# Patient Record
Sex: FEMALE | Race: AMERICAN INDIAN OR ALASKA NATIVE
[De-identification: names, ages, dates, MRNs, and addresses within clinical notes are randomized per-mention and may not be internally consistent; named-entity substitution may affect disease eponyms.]

---

## 2017-04-01 ENCOUNTER — HOSPITAL ENCOUNTER (EMERGENCY)
Dept: HOSPITAL 31 - C.ER | Age: 72
Discharge: HOME | End: 2017-04-01
Payer: COMMERCIAL

## 2017-04-01 VITALS — TEMPERATURE: 97.8 F | SYSTOLIC BLOOD PRESSURE: 175 MMHG | OXYGEN SATURATION: 98 % | DIASTOLIC BLOOD PRESSURE: 90 MMHG

## 2017-04-01 VITALS — RESPIRATION RATE: 20 BRPM | HEART RATE: 81 BPM

## 2017-04-01 DIAGNOSIS — M25.561: Primary | ICD-10-CM

## 2017-04-01 PROCEDURE — 99283 EMERGENCY DEPT VISIT LOW MDM: CPT

## 2017-04-01 PROCEDURE — 96372 THER/PROPH/DIAG INJ SC/IM: CPT

## 2017-04-01 NOTE — C.PDOC
History Of Present Illness


72 y/o female presents to the ED with complains of chronic right knee pain with 

exacerbation over the past week. Pt denies fall or new trauma. Pt is scheduled 

to have surgery in future for cartilage replacement. Pt denies numbness, 

weakness, fever or any other complaints.


Chief Complaint (Nursing): Back Pain


History Per: Patient


History/Exam Limitations: no limitations


Onset/Duration Of Symptoms: Days


Current Symptoms Are (Timing): Still Present


Quality Of Discomfort: "Pain"


Severity: Moderate


Previous Symptoms: Chronic Pain


Associated Symptoms: None


Recent travel outside of the [place]: No





Past Medical History


Reviewed: Historical Data, Nursing Documentation, Vital Signs


Vital Signs: 


 Last Vital Signs











Temp  97.8 F   04/01/17 10:34


 


Pulse  81   04/01/17 10:34


 


Resp  20   04/01/17 10:34


 


BP  175/90 H  04/01/17 10:34


 


Pulse Ox  98   04/01/17 10:55














- Medical History


PMH: Bronchitis, COPD, HTN





- [facility] Procedures








CYSTOCELE REPAIR (07/06/98)


CYSTOGRAM NEC (04/27/98)


CYSTOMETROGRAM (04/27/98)


CYSTOSCOPY NEC (07/06/98)


OTHER CYSTOSTOMY (07/06/98)


SUPRAPUBIC SLING OP (07/06/98)








Family History: States: Unknown Family Hx





- Social History


Hx Alcohol Use: No


Hx Substance Use: No





- Immunization History


Hx Tetanus Toxoid Vaccination: Yes


Hx Influenza Vaccination: Yes


Hx Pneumococcal Vaccination: Yes





Review Of Systems


Except As Marked, All Systems Reviewed And Found Negative.


Constitutional: Negative for: Fever


Musculoskeletal: Positive for: Other (right knee pain)


Neurological: Negative for: Weakness, Numbness





Physical Exam





- Physical Exam


Appears: Non-toxic, No Acute Distress


Skin: Warm, Dry, No Rash


Head: Atraumatic, Normacephalic


Extremity: Capillary Refill (<2 seconds), No Deformity, Other (chronic swollen 

right knee joint, creppitus noted with flexion and extension)


Pulses: Right Dorsalis Pedis: Normal


Neurological/Psych: Oriented x3, Normal Speech, Normal Motor, Normal Sensation





ED Course And Treatment


O2 Sat by Pulse Oximetry: 98 (on room air)


Pulse Ox Interpretation: Normal





Disposition





- Disposition


[facility] 


[facility] - 


Disposition: HOME/ ROUTINE


Disposition Time: 09:30


Condition: GOOD


Additional Instructions: 





Thank you for letting us take care of you today. Your provider was Dr. Schmidt. You were treated for chronic knee pain. The emergency medical care 

you received today was directed at your acute symptoms. If you were prescribed 

any medication, please fill it and take as directed. It may take several days 

for your symptoms to resolve. Return to the Emergency Department if your 

symptoms worsen, do not improve, or if you have any other problems.





Please contact your doctor or call one of the physicians/clinics you have been 

referred to that are listed on the Patient Visit Information form that is 

included in your discharge packet. Bring any paperwork you were given at 

discharge with you along with any medications you are taking to your follow up 

visit. Our treatment cannot replace ongoing medical care by a primary care 

provider (PCP) outside of the emergency department.





Thank you for allowing the [facility] team to be part of your care today.














Follow up with your doctor on Wednesday as scheduled for re-evaluation.


Prescriptions: 


Cyclobenzaprine [Cyclobenzaprine HCl] 10 mg PO Q8 PRN #20 tab


 PRN Reason: Muscle Spasm


Ibuprofen [Motrin] 600 mg PO Q6 PRN #20 tab


 PRN Reason: Pain, Moderate (4-7)


Instructions:  Knee Pain (ED)





- Clinical Impression


Clinical Impression: 


 Right knee pain





- Scribe Statement


The provider has reviewed the documentation as recorded by the Tania Patel


Provider Attestation: 





All medical record entries made by the Tania were at my direction and 

personally dictated by me. I have reviewed the chart and agree that the record 

accurately reflects my personal performance of the history, physical exam, 

medical decision making, and the department course for this patient. I have 

also personally directed, reviewed, and agree with the discharge instructions 

and disposition.

## 2018-01-31 ENCOUNTER — HOSPITAL ENCOUNTER (INPATIENT)
Dept: HOSPITAL 31 - C.9S | Age: 73
LOS: 3 days | Discharge: NURSING, REHAB OR LONG-TERM CARE | DRG: 470 | End: 2018-02-03
Attending: INTERNAL MEDICINE | Admitting: INTERNAL MEDICINE
Payer: MEDICARE

## 2018-01-31 VITALS — BODY MASS INDEX: 27.3 KG/M2

## 2018-01-31 DIAGNOSIS — I51.7: ICD-10-CM

## 2018-01-31 DIAGNOSIS — J44.9: ICD-10-CM

## 2018-01-31 DIAGNOSIS — R50.82: ICD-10-CM

## 2018-01-31 DIAGNOSIS — E78.5: ICD-10-CM

## 2018-01-31 DIAGNOSIS — M65.9: ICD-10-CM

## 2018-01-31 DIAGNOSIS — M17.11: Primary | ICD-10-CM

## 2018-01-31 DIAGNOSIS — I11.9: ICD-10-CM

## 2018-01-31 PROCEDURE — 0SRC069 REPLACEMENT OF RIGHT KNEE JOINT WITH OXIDIZED ZIRCONIUM ON POLYETHYLENE SYNTHETIC SUBSTITUTE, CEMENTED, OPEN APPROACH: ICD-10-PCS | Performed by: STUDENT IN AN ORGANIZED HEALTH CARE EDUCATION/TRAINING PROGRAM

## 2018-01-31 RX ADMIN — CEFAZOLIN SODIUM SCH MLS/HR: 2 SOLUTION INTRAVENOUS at 22:17

## 2018-01-31 NOTE — RAD
Indication: Status post right total knee arthroplasty 



Comparison: Bilateral knee radiographs performed 1/5/18. 



Two views, left knee 



Findings: 



The patient is status post left knee total arthroplasty.  Alignment 

appears satisfactory.  Soft tissue swelling, subcutaneous emphysema, 

drainage catheter, and surgical staples compatible with recent 

postoperative history 



Impression: 



Status post left knee arthroplasty as above.

## 2018-01-31 NOTE — PCM.ANESB7
Adductor Canal Block





- Adductor Canal Block


Date of Procedure: 01/31/18


Anesthiologist: linwood


Pre-Procedure Diagnosis: right knee oa`


Post-Procedure Diagnosis: same


Procedure Performed: Adductor Canal Block Right





- Procedure


Adductor Canal Block: 


The procedure was explained to the patient that it is for the post-operative 

pain management. Consent was obtained after a thorough discussion with the 

patient regarding the benefits and possible complications of local anesthetic 

adductor canal block of the femoral nerve. Standard monitors, as defined by the 

ASA, were applied to the patient. Time-out was held with the circulating nurse 

to confirm the appropriate block. After applying supplemental oxygen and 

administering IV Sedation as needed, the patient was placed in supine position 

with and the operative leg was flexed slightly at the knee and externally 

rotated as needed, and was kept anatomically stable. The mid-thigh of the  _____

___  lower extremity was exposed. The ultrasound transducer was then applied 

transversely along the medial aspect, about midway down the thigh and the 

femoral artery and vein were identified in appropriate relation with the 

sartorius muscle. At this time, the femoral nerve was visualized lateral to the 

femoral artery within the canal. After thorough identification, this area area 

was prepped with Chloroprep solution three times and 1 % Lidocaine was injected 

subcutaneously for topical anesthesia.





After negative aspiration, ___5__cc of __.25___% _______bupivicaine_____________

was injected and this was followed with ___15___ cc of ___.25____ % _______

bupvicaine_______. Under ultrasound guidance the local anesthetics were 

observed spreading around the femoral nerve. The needle was removed intact and 

sterile dressing was applied. The patient had stable vital signs, was conscious 

and in no apparent distress.





The patient tolerated the femoral nerve block well with stable vital signs and 

was prepared for subsequent surgery

## 2018-02-01 LAB
ALBUMIN SERPL-MCNC: 2.9 G/DL (ref 3.5–5)
ALBUMIN SERPL-MCNC: 3.2 G/DL (ref 3.5–5)
ALBUMIN/GLOB SERPL: 1 {RATIO} (ref 1–2.1)
ALBUMIN/GLOB SERPL: 1.1 {RATIO} (ref 1–2.1)
ALT SERPL-CCNC: 25 U/L (ref 9–52)
ALT SERPL-CCNC: 30 U/L (ref 9–52)
AST SERPL-CCNC: 22 U/L (ref 14–36)
AST SERPL-CCNC: 22 U/L (ref 14–36)
BASOPHILS # BLD AUTO: 0 K/UL (ref 0–0.2)
BASOPHILS NFR BLD: 0.5 % (ref 0–2)
BUN SERPL-MCNC: 19 MG/DL (ref 7–17)
BUN SERPL-MCNC: 20 MG/DL (ref 7–17)
CALCIUM SERPL-MCNC: 8.2 MG/DL (ref 8.6–10.4)
CALCIUM SERPL-MCNC: 8.3 MG/DL (ref 8.6–10.4)
EOSINOPHIL # BLD AUTO: 0.1 K/UL (ref 0–0.7)
EOSINOPHIL NFR BLD: 1.6 % (ref 0–4)
ERYTHROCYTE [DISTWIDTH] IN BLOOD BY AUTOMATED COUNT: 15 % (ref 11.5–14.5)
ERYTHROCYTE [DISTWIDTH] IN BLOOD BY AUTOMATED COUNT: 15.2 % (ref 11.5–14.5)
GFR NON-AFRICAN AMERICAN: 55
GFR NON-AFRICAN AMERICAN: 55
HGB BLD-MCNC: 10.5 G/DL (ref 11–16)
HGB BLD-MCNC: 10.8 G/DL (ref 11–16)
INR PPP: 1.1
LYMPHOCYTES # BLD AUTO: 2.2 K/UL (ref 1–4.3)
LYMPHOCYTES NFR BLD AUTO: 28.9 % (ref 20–40)
MCH RBC QN AUTO: 28.3 PG (ref 27–31)
MCH RBC QN AUTO: 29 PG (ref 27–31)
MCHC RBC AUTO-ENTMCNC: 32.6 G/DL (ref 33–37)
MCHC RBC AUTO-ENTMCNC: 33.6 G/DL (ref 33–37)
MCV RBC AUTO: 86.5 FL (ref 81–99)
MCV RBC AUTO: 86.8 FL (ref 81–99)
MONOCYTES # BLD: 1 K/UL (ref 0–0.8)
MONOCYTES NFR BLD: 13.1 % (ref 0–10)
NEUTROPHILS # BLD: 4.2 K/UL (ref 1.8–7)
NEUTROPHILS NFR BLD AUTO: 55.9 % (ref 50–75)
NRBC BLD AUTO-RTO: 0 % (ref 0–2)
PLATELET # BLD: 148 K/UL (ref 130–400)
PLATELET # BLD: 152 K/UL (ref 130–400)
PMV BLD AUTO: 8.9 FL (ref 7.2–11.7)
PMV BLD AUTO: 9.2 FL (ref 7.2–11.7)
PROTHROMBIN TIME: 12.6 SECONDS (ref 9.7–12.2)
RBC # BLD AUTO: 3.62 MIL/UL (ref 3.8–5.2)
RBC # BLD AUTO: 3.82 MIL/UL (ref 3.8–5.2)
WBC # BLD AUTO: 7.6 K/UL (ref 4.8–10.8)
WBC # BLD AUTO: 9.2 K/UL (ref 4.8–10.8)

## 2018-02-01 RX ADMIN — CEFAZOLIN SODIUM SCH MLS/HR: 2 SOLUTION INTRAVENOUS at 21:20

## 2018-02-01 RX ADMIN — OXYCODONE HYDROCHLORIDE AND ACETAMINOPHEN PRN TAB: 5; 325 TABLET ORAL at 05:45

## 2018-02-01 RX ADMIN — OXYCODONE HYDROCHLORIDE AND ACETAMINOPHEN PRN TAB: 5; 325 TABLET ORAL at 01:01

## 2018-02-01 RX ADMIN — CEFAZOLIN SODIUM SCH MLS/HR: 2 SOLUTION INTRAVENOUS at 05:51

## 2018-02-01 RX ADMIN — CEFAZOLIN SODIUM SCH MLS/HR: 2 SOLUTION INTRAVENOUS at 13:17

## 2018-02-01 RX ADMIN — ENOXAPARIN SODIUM SCH MG: 40 INJECTION SUBCUTANEOUS at 09:21

## 2018-02-01 RX ADMIN — OXYCODONE HYDROCHLORIDE AND ACETAMINOPHEN PRN TAB: 5; 325 TABLET ORAL at 16:44

## 2018-02-01 NOTE — CP.PCM.PN
Subjective





- Date & Time of Evaluation


Date of Evaluation: 02/01/18


Time of Evaluation: 10:04





- Subjective


Subjective: 





PGY 2 progress note for Dr. Tipton





72 year old female with past medical history of COPD and HTN is s/p right TKR.  

Pt is seen and examined at bedside.  No acute events overnight.  Pt states that 

she is passing gas this morning.  Denies having any CP, SOB, abd pain, N/V/D/C.

  Patient has about 2-3/10 pain in right LE.  12 point ROS negative except for 

the above mentioned.





PMHx: stated above


Sx: right TKR


Social: denies tobacco, ETOH or drug use








Objective





- Vital Signs/Intake and Output


Vital Signs (last 24 hours): 


 











Temp Pulse Resp BP Pulse Ox


 


 99.1 F   80   20   124/64   99 


 


 02/01/18 08:34  02/01/18 08:34  02/01/18 08:34  02/01/18 08:34  02/01/18 08:34








Intake and Output: 


 











 02/01/18 02/01/18





 06:59 18:59


 


Intake Total 990 


 


Output Total 1175 


 


Balance -185 














- Medications


Medications: 


 Current Medications





Acetaminophen (Tylenol 325mg Tab)  650 mg PO Q4 PRN


   PRN Reason: Fever 101 degrees fahrenheit 


Amlodipine Besylate (Norvasc)  10 mg PO DAILY [facility]


   Last Admin: 02/01/18 09:21 Dose:  10 mg


Enoxaparin Sodium (Lovenox)  40 mg SC DAILY [facility]


   Last Admin: 02/01/18 09:21 Dose:  40 mg


Sodium Chloride (Sodium Chloride 0.9%)  1,000 mls @ 0 mls/hr IV .Q0M [facility]


   PRN Reason: Per Protocol


Cefazolin Sodium/Dextrose (Ancef Iv 2 Gm Duplex)  2 gm in 50 mls @ 100 mls/hr 

IVPB Q8H [facility]


   Last Admin: 02/01/18 05:51 Dose:  100 mls/hr


Sodium Chloride (Sodium Chloride 0.9%)  1,000 mls @ 100 mls/hr IV .Q10H [facility]


   Last Admin: 02/01/18 04:30 Dose:  Not Given


Losartan Potassium (Cozaar)  100 mg PO DAILY [facility]


   Last Admin: 02/01/18 09:21 Dose:  100 mg


Morphine Sulfate (Morphine)  2 mg IVP Q4H PRN


   PRN Reason: Pain, severe (8-10)


Ondansetron HCl (Zofran Inj)  4 mg IVP ONCE PRN


   PRN Reason: Nausea/Vomiting


Oxycodone/Acetaminophen (Percocet 5/325 Mg Tab)  2 tab PO Q4H PRN


   PRN Reason: Pain, severe (8-10)


   Stop: 02/03/18 12:49


   Last Admin: 02/01/18 05:45 Dose:  2 tab


Fluticasone/Salmeterol (Advair Diskus 250/50)  1 puff INH RQ12 [facility]


Tiotropium Bromide (Spiriva Inhalation Handihaler Device)  0 inhaler INH RQD [facility]











- Constitutional


Appears: Non-toxic, No Acute Distress





- Head Exam


Head Exam: ATRAUMATIC





- ENT Exam


ENT Exam: Mucous Membranes Moist





- Respiratory Exam


Respiratory Exam: Clear to Ausculation Bilateral.  absent: Accessory Muscle Use

, Rhonchi, Wheezes, Respiratory Distress





- Cardiovascular Exam


Cardiovascular Exam: REGULAR RHYTHM, +S1, +S2





- GI/Abdominal Exam


GI & Abdominal Exam: Soft, Normal Bowel Sounds.  absent: Distended, Firm, 

Guarding, Rigid, Tenderness, Organomegaly





- Extremities Exam


Extremities Exam: absent: Calf Tenderness, Pedal Edema, Tenderness


Additional comments: 





full ROM in lower extremities.  no tenderness to palpation on bilateral knees.  





- Neurological Exam


Neurological Exam: Alert, Awake





- Psychiatric Exam


Psychiatric exam: Normal Affect, Normal Mood





- Skin


Skin Exam: Dry, Intact, Normal Color, Warm





Assessment and Plan





- Assessment and Plan (Free Text)


Assessment: 





72 year old female with past medical history of HTN and COPD is s/p right TKR 

POD #1.  





Right Total Knee Replacement


- POD #1


- Pain management with percocet 2 tabs q4 morphine 2 mg IVP q4 prn. Patient 

also received femoral nerve block 


- Zofran prn for nausea


- PT/OT ordered





HTN


- Continue home medications: amlodipine and cozaar


- will continue to monitor 





COPD


- Continue Advair and spiriva


- incentive spirometer is ordered.  Explained to pt the importance of using it 

regularly.





Prophylaxis


- pepcid 


- lovenox, SCDs





Case discussed with attending, Dr. Tipton.  All managements and orders per Dr. Tipton

## 2018-02-01 NOTE — CP.PCM.PN
Subjective





- Date & Time of Evaluation


Date of Evaluation: 02/01/18


Time of Evaluation: 13:40





- Subjective


Subjective: 





Patient states pain is controlled, complaining of nausea/no vomiting





Objective





- Vital Signs/Intake and Output


Vital Signs (last 24 hours): 


 











Temp Pulse Resp BP Pulse Ox


 


 99.1 F   80   20   124/64   99 


 


 02/01/18 08:34  02/01/18 08:34  02/01/18 08:34  02/01/18 08:34  02/01/18 08:34








Intake and Output: 


 











 02/01/18 02/01/18





 06:59 18:59


 


Intake Total 990 


 


Output Total 1175 


 


Balance -185 














- Medications


Medications: 


 Current Medications





Acetaminophen (Tylenol 325mg Tab)  650 mg PO Q4 PRN


   PRN Reason: Fever 101 degrees fahrenheit 


Amlodipine Besylate (Norvasc)  10 mg PO DAILY [facility]


   Last Admin: 02/01/18 09:21 Dose:  10 mg


Enoxaparin Sodium (Lovenox)  40 mg SC DAILY [facility]


   Last Admin: 02/01/18 09:21 Dose:  40 mg


Famotidine (Pepcid)  20 mg PO DAILY [facility]


Sodium Chloride (Sodium Chloride 0.9%)  1,000 mls @ 0 mls/hr IV .Q0M [facility]


   PRN Reason: Per Protocol


Cefazolin Sodium/Dextrose (Ancef Iv 2 Gm Duplex)  2 gm in 50 mls @ 100 mls/hr 

IVPB Q8H [facility]


   Last Admin: 02/01/18 13:17 Dose:  100 mls/hr


Sodium Chloride (Sodium Chloride 0.9%)  1,000 mls @ 100 mls/hr IV .Q10H [facility]


   Last Admin: 02/01/18 04:30 Dose:  Not Given


Losartan Potassium (Cozaar)  100 mg PO DAILY [facility]


   Last Admin: 02/01/18 09:21 Dose:  100 mg


Morphine Sulfate (Morphine)  2 mg IVP Q4H PRN


   PRN Reason: Pain, severe (8-10)


Ondansetron HCl (Zofran Inj)  4 mg IVP Q6H PRN


   PRN Reason: Nausea/Vomiting


   Last Admin: 02/01/18 10:48 Dose:  4 mg


Oxycodone/Acetaminophen (Percocet 5/325 Mg Tab)  2 tab PO Q4H PRN


   PRN Reason: Pain, severe (8-10)


   Stop: 02/03/18 12:49


   Last Admin: 02/01/18 05:45 Dose:  2 tab


Fluticasone/Salmeterol (Advair Diskus 250/50)  1 puff INH RQ12 [facility]


Tiotropium Bromide (Spiriva Inhalation Handihaler Device)  0 inhaler INH RQD [facility]











- Labs


Labs: 


 





 02/01/18 11:15 





 02/01/18 11:15 











- Extremities Exam


Additional comments: 





hemovac 100 left intact


+ROM ankle/toes, sensation intact +DP/PT pulses calves soft NT neg homans





Assessment and Plan


(1) Primary osteoarthritis of right knee


Assessment & Plan: 


POD#1 s/p right TKR


pt/ot


vte proph


d/c planning


labs in am


d/w DR. Mcguire, agrees with above


Status: Acute

## 2018-02-02 VITALS — RESPIRATION RATE: 20 BRPM

## 2018-02-02 LAB
ALBUMIN SERPL-MCNC: 2.9 G/DL (ref 3.5–5)
ALBUMIN/GLOB SERPL: 1 {RATIO} (ref 1–2.1)
ALT SERPL-CCNC: 27 U/L (ref 9–52)
AST SERPL-CCNC: 18 U/L (ref 14–36)
BACTERIA #/AREA URNS HPF: (no result) /[HPF]
BASOPHILS # BLD AUTO: 0 K/UL (ref 0–0.2)
BASOPHILS NFR BLD: 0.5 % (ref 0–2)
BILIRUB UR-MCNC: NEGATIVE MG/DL
BUN SERPL-MCNC: 15 MG/DL (ref 7–17)
CALCIUM SERPL-MCNC: 8.5 MG/DL (ref 8.6–10.4)
EOSINOPHIL # BLD AUTO: 0.2 K/UL (ref 0–0.7)
EOSINOPHIL NFR BLD: 1.7 % (ref 0–4)
ERYTHROCYTE [DISTWIDTH] IN BLOOD BY AUTOMATED COUNT: 14.7 % (ref 11.5–14.5)
GFR NON-AFRICAN AMERICAN: > 60
GLUCOSE UR STRIP-MCNC: NORMAL MG/DL
HGB BLD-MCNC: 9.7 G/DL (ref 11–16)
LEUKOCYTE ESTERASE UR-ACNC: (no result) LEU/UL
LYMPHOCYTES # BLD AUTO: 2.1 K/UL (ref 1–4.3)
LYMPHOCYTES NFR BLD AUTO: 21.4 % (ref 20–40)
MCH RBC QN AUTO: 28.9 PG (ref 27–31)
MCHC RBC AUTO-ENTMCNC: 33.4 G/DL (ref 33–37)
MCV RBC AUTO: 86.6 FL (ref 81–99)
MONOCYTES # BLD: 1.1 K/UL (ref 0–0.8)
MONOCYTES NFR BLD: 11.6 % (ref 0–10)
NEUTROPHILS # BLD: 6.4 K/UL (ref 1.8–7)
NEUTROPHILS NFR BLD AUTO: 64.8 % (ref 50–75)
NRBC BLD AUTO-RTO: 0 % (ref 0–2)
PH UR STRIP: 5 [PH] (ref 5–8)
PLATELET # BLD: 129 K/UL (ref 130–400)
PMV BLD AUTO: 9.2 FL (ref 7.2–11.7)
PROT UR STRIP-MCNC: (no result) MG/DL
RBC # BLD AUTO: 3.35 MIL/UL (ref 3.8–5.2)
RBC # UR STRIP: NEGATIVE /UL
SP GR UR STRIP: 1.02 (ref 1–1.03)
SQUAMOUS EPITHIAL: 6 /HPF (ref 0–5)
URINE NITRATE: NEGATIVE
UROBILINOGEN UR-MCNC: NORMAL MG/DL (ref 0.2–1)
WBC # BLD AUTO: 9.8 K/UL (ref 4.8–10.8)

## 2018-02-02 RX ADMIN — OXYCODONE HYDROCHLORIDE AND ACETAMINOPHEN PRN TAB: 5; 325 TABLET ORAL at 09:38

## 2018-02-02 RX ADMIN — CEFAZOLIN SODIUM SCH MLS/HR: 2 SOLUTION INTRAVENOUS at 05:52

## 2018-02-02 RX ADMIN — CEFAZOLIN SODIUM SCH MLS/HR: 2 SOLUTION INTRAVENOUS at 15:35

## 2018-02-02 RX ADMIN — OXYCODONE HYDROCHLORIDE AND ACETAMINOPHEN PRN TAB: 5; 325 TABLET ORAL at 00:52

## 2018-02-02 RX ADMIN — OXYCODONE HYDROCHLORIDE AND ACETAMINOPHEN PRN TAB: 5; 325 TABLET ORAL at 13:35

## 2018-02-02 RX ADMIN — CEFAZOLIN SODIUM SCH MLS/HR: 2 SOLUTION INTRAVENOUS at 23:10

## 2018-02-02 RX ADMIN — ENOXAPARIN SODIUM SCH MG: 40 INJECTION SUBCUTANEOUS at 09:38

## 2018-02-02 NOTE — CP.PCM.PN
Subjective





- Date & Time of Evaluation


Date of Evaluation: 02/02/18


Time of Evaluation: 09:12





- Subjective


Subjective: 





PGY2 progress note for Dr. Tipton





Pt seen and examined at bedside. Pt was noted to be febrile yesterday with temp 

of 101 degrees. Afebirle this morning.  pt complains of right knee pain.  Also 

complains of increased urinary frequency.  Denies having any CP, SOB, abd pain, 

N/V/D/C, F/C.  Tolerating diet and had BM this am.   





Objective





- Vital Signs/Intake and Output


Vital Signs (last 24 hours): 


 











Temp Pulse Resp BP Pulse Ox


 


 98.5 F   88   18   198/88 H  98 


 


 02/02/18 07:50  02/02/18 07:50  02/02/18 07:50  02/02/18 07:50  02/02/18 07:50








Intake and Output: 


 











 02/02/18 02/02/18





 06:59 18:59


 


Intake Total 1100 


 


Output Total 825 


 


Balance 275 














- Medications


Medications: 


 Current Medications





Acetaminophen (Tylenol 325mg Tab)  650 mg PO Q4 PRN


   PRN Reason: Fever 101 degrees fahrenheit 


   Last Admin: 02/02/18 02:43 Dose:  650 mg


Amlodipine Besylate (Norvasc)  10 mg PO DAILY [facility]


   Last Admin: 02/01/18 09:21 Dose:  10 mg


Enoxaparin Sodium (Lovenox)  40 mg SC DAILY [facility]


   Last Admin: 02/01/18 09:21 Dose:  40 mg


Famotidine (Pepcid)  20 mg PO DAILY [facility]


Cefazolin Sodium/Dextrose (Ancef Iv 2 Gm Duplex)  2 gm in 50 mls @ 100 mls/hr 

IVPB Q8H [facility]


   Last Admin: 02/02/18 05:52 Dose:  100 mls/hr


Losartan Potassium (Cozaar)  100 mg PO DAILY [facility]


   Last Admin: 02/01/18 09:21 Dose:  100 mg


Morphine Sulfate (Morphine)  2 mg IVP Q4H PRN


   PRN Reason: Pain, severe (8-10)


Ondansetron HCl (Zofran Inj)  4 mg IVP Q6H PRN


   PRN Reason: Nausea/Vomiting


   Last Admin: 02/01/18 10:48 Dose:  4 mg


Oxycodone/Acetaminophen (Percocet 5/325 Mg Tab)  2 tab PO Q4H PRN


   PRN Reason: Pain, severe (8-10)


   Stop: 02/03/18 12:49


   Last Admin: 02/02/18 00:52 Dose:  2 tab


Fluticasone/Salmeterol (Advair Diskus 250/50)  1 puff INH RQ12 [facility]


Tiotropium Bromide (Spiriva Inhalation Handihaler Device)  0 inhaler INH RQD [facility]











- Labs


Labs: 


 





 02/02/18 08:05 





 02/02/18 08:05 





 











PT  12.6 SECONDS (9.7-12.2)  H  02/01/18  13:48    


 


INR  1.1   02/01/18  13:48    














- Constitutional


Appears: Non-toxic, No Acute Distress





- Head Exam


Head Exam: ATRAUMATIC, NORMOCEPHALIC





- ENT Exam


ENT Exam: Mucous Membranes Moist





- Respiratory Exam


Respiratory Exam: Clear to Ausculation Bilateral.  absent: Rales, Rhonchi, 

Wheezes





- Cardiovascular Exam


Cardiovascular Exam: REGULAR RHYTHM, +S1, +S2.  absent: Gallop, Rubs, Murmur





- GI/Abdominal Exam


GI & Abdominal Exam: Soft, Normal Bowel Sounds.  absent: Distended, Firm, 

Guarding, Rigid, Tenderness, Organomegaly





- Extremities Exam


Extremities Exam: absent: Pedal Edema, Tenderness





- Neurological Exam


Neurological Exam: Alert, Awake, Oriented x3





- Psychiatric Exam


Psychiatric exam: Normal Affect, Normal Mood





- Skin


Skin Exam: Dry, Intact, Normal Color, Warm





Assessment and Plan





- Assessment and Plan (Free Text)


Assessment: 





72 year old female with past medical history of HTN and COPD is s/p right TKR 

POD #1.  





Right Total Knee Replacement


- POD #1


- Pain management with percocet 2 tabs q4 morphine 2 mg IVP q4 prn. Patient 

also received femoral nerve block 


- Zofran prn for nausea


- PT/OT ordered





Post-operative fever


- Pt advised to use incentive spirometer regularly.  


- CXR this am showed mild venous congestion, right hilar prominence and 

cardiomegaly


- Will check urinalysis





HTN


- Pt hypertensive this am. Given hydralzine 10 mg IVP


- Started pt on HCTZ 12.5 mg qd


- Continue home medications: amlodipine and cozaar


- will continue to monitor 


- d/leonor IV fluids 





COPD


- Continue Advair and spiriva





Prophylaxis


- pepcid 


- lovenox, SCDs





Case discussed with attending, Dr. Tipton.  All managements and orders per Dr. Tipton

## 2018-02-02 NOTE — CON
DATE:  02/01/2018



HISTORY OF PRESENT ILLNESS:  This is a 72-year-old female, admitted to the

hospital with chief complaint of right knee replacement.  The patient has

history of hypertension and history of osteoarthritis.



MEDICATIONS:  Norvasc 10 mg daily.



PHYSICAL EXAMINATION

GENERAL:  The patient is awake, alert, and oriented.

VITAL SIGNS:  Temperature is 98, pulse 90.

HEENT:  Within normal limits.

NECK:  Supple.

CHEST:  Symmetrical.

HEART:  Regular.

ABDOMEN:  Soft.

EXTREMITIES:  No edema.



ASSESSMENT AND PLAN:  The patient suffers from right knee replacement,

osteoarthritis, and hypertension.  We will restart Norvasc 10 mg daily. 

Monitor blood pressure.  Physical therapy.





__________________________________________

Mariajose Tipton MD



DD:  02/01/2018 10:46:04

DT:  02/01/2018 12:11:30

Job # [id number]

## 2018-02-02 NOTE — RAD
Chest x-ray single frontal view 



History: Postop fever. 



Comparison: 01/05/2018 



Findings: 



Mild venous congestion. 



Right hilar prominence. 



Tortuous ectatic aorta. 



Cardiomegaly. 



Degenerative changes in the spine and shoulders. 



Impression: 



Mild venous congestion. 



Right hilar prominence. 



Tortuous ectatic aorta. 



Cardiomegaly.

## 2018-02-03 VITALS — TEMPERATURE: 98 F | DIASTOLIC BLOOD PRESSURE: 84 MMHG | HEART RATE: 74 BPM | SYSTOLIC BLOOD PRESSURE: 168 MMHG

## 2018-02-03 VITALS — OXYGEN SATURATION: 98 %

## 2018-02-03 LAB
ALBUMIN SERPL-MCNC: 3 G/DL (ref 3.5–5)
ALBUMIN/GLOB SERPL: 1 {RATIO} (ref 1–2.1)
ALT SERPL-CCNC: 19 U/L (ref 9–52)
AST SERPL-CCNC: 17 U/L (ref 14–36)
BASOPHILS # BLD AUTO: 0 K/UL (ref 0–0.2)
BASOPHILS NFR BLD: 0.4 % (ref 0–2)
BUN SERPL-MCNC: 14 MG/DL (ref 7–17)
CALCIUM SERPL-MCNC: 8.7 MG/DL (ref 8.6–10.4)
EOSINOPHIL # BLD AUTO: 0.1 K/UL (ref 0–0.7)
EOSINOPHIL NFR BLD: 0.7 % (ref 0–4)
ERYTHROCYTE [DISTWIDTH] IN BLOOD BY AUTOMATED COUNT: 14.4 % (ref 11.5–14.5)
GFR NON-AFRICAN AMERICAN: > 60
HGB BLD-MCNC: 9.6 G/DL (ref 11–16)
LYMPHOCYTES # BLD AUTO: 1.3 K/UL (ref 1–4.3)
LYMPHOCYTES NFR BLD AUTO: 13.5 % (ref 20–40)
MCH RBC QN AUTO: 29.6 PG (ref 27–31)
MCHC RBC AUTO-ENTMCNC: 34.2 G/DL (ref 33–37)
MCV RBC AUTO: 86.4 FL (ref 81–99)
MONOCYTES # BLD: 0.9 K/UL (ref 0–0.8)
MONOCYTES NFR BLD: 9.5 % (ref 0–10)
NEUTROPHILS # BLD: 7.1 K/UL (ref 1.8–7)
NEUTROPHILS NFR BLD AUTO: 75.9 % (ref 50–75)
NRBC BLD AUTO-RTO: 0 % (ref 0–2)
PLATELET # BLD: 126 K/UL (ref 130–400)
PMV BLD AUTO: 9.6 FL (ref 7.2–11.7)
RBC # BLD AUTO: 3.25 MIL/UL (ref 3.8–5.2)
WBC # BLD AUTO: 9.4 K/UL (ref 4.8–10.8)

## 2018-02-03 RX ADMIN — OXYCODONE HYDROCHLORIDE AND ACETAMINOPHEN PRN TAB: 5; 325 TABLET ORAL at 09:49

## 2018-02-03 RX ADMIN — ENOXAPARIN SODIUM SCH MG: 40 INJECTION SUBCUTANEOUS at 09:44

## 2018-02-03 RX ADMIN — CEFAZOLIN SODIUM SCH MLS/HR: 2 SOLUTION INTRAVENOUS at 13:14

## 2018-02-03 RX ADMIN — CEFAZOLIN SODIUM SCH MLS/HR: 2 SOLUTION INTRAVENOUS at 05:15

## 2018-02-03 NOTE — CP.PCM.PN
Subjective





- Date & Time of Evaluation


Date of Evaluation: 02/03/18


Time of Evaluation: 14:46





- Subjective


Subjective: 





PT CLEARED FOR D/C TO [facility] TODAY PER DR. MCKEON.  PRIMARY RN JUAN CARLOS 

SPOKE W DR. DE LEÓN REGARDING D/C PLAN.  [facility]THO WOULD LIKE PT D/C ASAP.  PT 

CURRENTLY ON CPM AND TOLERATING WELL..  DISCUSSED WITH PT THAT SHE WILL 

CONTINUE IV ABX AT [facility] FOR 5 MORE DAYS; CHANGED ANCEF TO ROCEPHIN 1 GM 

IV QD---TO BE DC WITH HEPLOCK IN PLACE.  STRONGLY ENCOURAGED USING INCENTIVE 

SPIROMETRY EVERY HOUR TO PREVENT PNA.  FOR ORTHO F/U IN 10-14 DAYS.  REPEAT CBC 

FOR PLATELET COUNT; [facility] STAFF TO NOTIFY DR. MCKEON OF RESULTS.   TO ARRANGE 

TRANSPORTATION TO [facility] TODAY.  NO FURTHER ORDERS.





Objective





- Vital Signs/Intake and Output


Vital Signs (last 24 hours): 


 











Temp Pulse Resp BP Pulse Ox


 


 98.4 F   97 H  20   186/80 H  98 


 


 02/03/18 07:00  02/03/18 07:00  02/03/18 07:00  02/03/18 07:00  02/03/18 07:00











- Medications


Medications: 


 Current Medications





Acetaminophen (Tylenol 325mg Tab)  650 mg PO Q4 PRN


   PRN Reason: Fever 101 degrees fahrenheit 


   Last Admin: 02/02/18 02:43 Dose:  650 mg


Amlodipine Besylate (Norvasc)  10 mg PO DAILY [facility]


   Last Admin: 02/03/18 09:44 Dose:  10 mg


Enoxaparin Sodium (Lovenox)  40 mg SC DAILY [facility]


   Last Admin: 02/03/18 09:44 Dose:  40 mg


Famotidine (Pepcid)  20 mg PO DAILY [facility]


   Last Admin: 02/03/18 09:44 Dose:  20 mg


Hydrochlorothiazide (Microzide)  12.5 mg PO DAILY [facility]


   Last Admin: 02/03/18 09:44 Dose:  12.5 mg


Cefazolin Sodium/Dextrose (Ancef Iv 2 Gm Duplex)  2 gm in 50 mls @ 100 mls/hr 

IVPB Q8H [facility]


   Last Admin: 02/03/18 13:14 Dose:  100 mls/hr


Losartan Potassium (Cozaar)  100 mg PO DAILY [facility]


   Last Admin: 02/03/18 09:44 Dose:  100 mg


Morphine Sulfate (Morphine)  2 mg IVP Q4H PRN


   PRN Reason: Pain, severe (8-10)


Ondansetron HCl (Zofran Inj)  4 mg IVP Q6H PRN


   PRN Reason: Nausea/Vomiting


   Last Admin: 02/02/18 09:38 Dose:  4 mg


Fluticasone/Salmeterol (Advair Diskus 250/50)  1 puff INH RQ12 [facility]


Tiotropium Bromide (Spiriva Inhalation Handihaler Device)  0 inhaler INH RQD [facility]











- Labs


Labs: 


 





 02/03/18 11:22 





 02/03/18 11:22 





 











PT  12.6 SECONDS (9.7-12.2)  H  02/01/18  13:48    


 


INR  1.1   02/01/18  13:48

## 2018-02-03 NOTE — PCM.SURG1
Surgeon's Initial Post Op Note





- Surgeon's Notes


Surgeon: Aura Sanchez MD


Assistant: Richard Del Valle PA-C


Type of Anesthesia: General Endo, [facility]


Pre-Operative Diagnosis: Right knee #1 DJD.  #2 MFC AVN.  #3 synovitis.  #4 

varus


Operative Findings: Right knee #1 DJD.  #2 MFC AVN.  #3 synovitis.  #4 varus.  #

5 partial patellar tendon tear


Post-Operative Diagnosis: Right knee #1 DJD.  #2 MFC AVN.  #3 synovitis.  #4 

varus.  #5 partial patellar tendon tear


Operation Performed: Right knee #1 Total knee arthroplasty.  #2 patellar tendon 

primary repair


Specimen/Specimens Removed: Specimen= julio cuts to path per hospital protocol.

  tourniquet time = 98 min at 300mmHg.  Complications= none.  Implants= Medacta 

GMK TKA sphere system, size 5 cemented distal femur, size 5 cemented tibial 

base plate, size 2 cemented patellar button, size 5 14mm poly spacer.  Biomet 

cobalt cement w/ abx.  Arthrex 4.75 biocompostie swivel lock anchors x2 & 

fiberwire suture for patellar tendon repair


Estimated Blood Loss: EBL {In ML}: 25


Blood Products Given: N/A


Drains Used: Hemovac


Post-Op Condition: Good


Date of Surgery/Procedure: 01/31/18


Time of Surgery/Procedure: 17:00

## 2018-02-15 NOTE — OP
PROCEDURE DATE:  01/31/2018



PREOPERATIVE DIAGNOSES:  Right knee,

1.  Degenerative joint disease.

2.  Medial femoral condyle avascular necrosis.

3.  Synovitis.

4.  Varus deformity.



POSTOPERATIVE DIAGNOSES:  Right knee,

1.  Degenerative joint disease.

2.  Medial femoral condyle avascular necrosis.

3.  Synovitis.

4.  Varus deformity.

5.  Partial patellar tendon tear.



PROCEDURE:  Right Knee,

1.  Total knee arthroplasty.

2.  Patellar tendon primary repair.



SURGEON:  Aura Sanchez MD



ASSISTANT:  Richard Del Valle PA-C



JUSTIFICATION FOR ASSISTANT:  Richard Del Valle is a certified physician

assistant whose skilled surgical services was an absolute necessity for

successful completion of the procedure as he provided skilled surgical

assistance with positioning of the patient, positioning of extremity,

management of the surgical fields, retraction of the neurovascular

structures, preparation of distal femoral cuts, preparation of proximal

tibia cuts, trial implants, placement of final implants with good and

proper cement technique, primary repair of patellar tendon partial

tear/avulsion, wound closure, fitting and placement of knee immobilizer. 

Richard Del Valle was present for the entire case and it was an absolute

necessity for the successful completion of the procedure.



TYPE OF ANESTHESIA:  General endotracheal anesthesia with a postop regional

nerve block placed by Anesthesia staff in PACU.



SPECIMEN:  Bony cuts sent to pathology per hospital protocol.



TOURNIQUET TIME:  98 minutes at 300 mmHg.



COMPLICATIONS:  None.



DISPOSITION:  The patient was extubated and transferred to PACU in stable

condition and tolerated the procedure well.



ESTIMATED BLOOD LOSS:  25 mL.



DRAINS:  Hemovac drain x1.



IMPLANTS:

1.  Medacta GMK total knee arthroplasty sphere system consisting of a size

5 cemented distal femur, size 5 cemented tibial base plate, size 2 cemented

patellar button, size 5 of 14 mm polyethelene spacer.

2.  Biomet cobalt cement with antibiotics.

3.  Arthrex 4.75 BioComposite SwiveLock anchors x2 and FiberWire for the

patellar tendon repair.



DISPOSITION:  The patient was extubated in satisfactory and stable

condition and tolerated the procedure well.



INDICATIONS FOR SURGERY:  The patient is a 72-year-old female with a past

medical history significant for hypertension, hyperlipidemia, COPD, who

presents to the office for the first time under my care on 01/09/2016 with

right knee pain much worse than left knee pain for 2 years.  Prior to being

under my care, she had already done one year of conservative treatment for

what she knew already was early degenerative joint disease and medial

femoral condyle changes.  She has undergone multiple cortisone injections

and hyaluronic acid injection series, but she was not familiar with the

details of which injection or even who the orthopedic surgeon was.



Physical examination revealed significant crepitance and pain localized to

the medial joint line with ability to establish full range of motion and

very high activity level and independence.  We started with initial

injections and she was referred to physical therapy and obtained a brace. 

She was compliant with all conservative treatment recommendations.  She

underwent MRI of the right knee at [facility] on 06/16/2016 which was

read as,

1.  Complex tear seen at the level of posterior horn of medial meniscus.

2.  Focal cartilage loss overlying the mid-medial femoral condyle. 

Curvilinear focal signal abnormalities seen at the articular surface of the

mid-medial femoral condyle with exuberant surrounding reactive edema

suggestive of subchondral fracturing versus stress injury versus

osteochondral change versus spontaneous osteonecrosis of the knee.

3.  Linear increased signal at the anterior root and horn of the lateral

meniscus demonstrating apparent extension to the superior articular surface

suggestive of a small tear.

4.  High-grade sprain of MCL.

5.  High-grade sprain of medial patellar retinaculum.

6.  Cartilage thinning and loss overlying the mid-medial proximal tibia

with signal change at the adjacent marrow of the mid-medial proximal tibia

demonstrating decreased T1 signal and mild reactive edema suggestive of

severe osteochondral change.

7.  Focal cartilage thinning and loss overlying the anterior aspect of the

distal tibia at the level of intercondylar notch demonstrating decreased T1

signal and increased STIR signal suggestive of subchondral fracturing

versus stress injury versus osteochondral change.

8.  Moderate suprapatellar joint effusion.



I reviewed the MRI findings with the patient and we began to proceed with

conservative treatment under my care.  X-rays taken in my office and at

[facility] revealed that there was loss of the medial joint line at

that point in time and progressive AVN changes of the medial femoral

condyle.



Under my care in terms of conservative treatment, she had undergone almost

two years of physical therapy and bracing with no overall improvement,

multiple cortisone injections in the office including 06/09/2016,

08/22/2016, 09/19/2016, and 12/12/2017.  She was also compliant with Mobic

antiinflammatory medication use and compound antiinflammatory pain cream. 

Each one of the cortisone mixture injections would provide her with near

complete resolution of pain.  Some injections lasting a few months and some

injections only lasting a few weeks.  Once she followed up in my office in

12/2017, the x-rays in the office showed clear progression of the AVN and

loss of the contour of the articular surface of the medial femoral condyle

with a clear defect on the medial femoral condyle seen.  Throughout her

treatment, we had discussed what type of surgical treatment was best for

her in terms of a total knee arthroplasty versus a partial knee replacement

versus a biologic cartilage transplant to fill the defective area at the

medial femoral condyle.  At her age of 72, we decided that the best

treatment would be the all in all one surgery to treat.  After discussing

all the different treatment options, she chose and I agreed with her

decision to proceed with a total knee arthroplasty.  The risks, benefits

and alternatives of the procedure were discussed at length with the patient

with the risk including not limited to infection, neurovascular damage,

failure of implants, failure of surgery, need for further surgery, need for

revision surgery, iatrogenic injury, periprosthetic fracture, development

of chronic pain and disability, development of blood clot including DVT and

PE, anesthesia reactions including death and cardiopulmonary collapse. 

After answering all of her questions, she stated that she understood the

risks and wished to proceed with surgery.  She watched surgical animation

videos and diagnoses animation videos and stated that she had a good

understanding of her diagnoses as well as the procedure to be done.  She

was referred to primary care physician for preadmission testing and medical

and cardiac clearance and the procedure was scheduled at [facility] on

01/31/2018.  [place] [facility] [place] the custom cutting guides for

the procedure as well from a doctor.



PROCEDURE IN DETAIL:  The patient was identified in the preoperative

holding area and the right knee was marked for surgery.  Once again as

described above; the risks, benefits and alternatives of the procedure were

discussed at length with the patient and an informed consent was obtained. 

The right knee was marked for surgery.  After brief discussion with the

anesthesia staff, perioperative IV antibiotics in the form of 2 gm of Ancef

were administered and the patient was taken to the operating room, placed

in a well-padded operating room table with all bony prominences and

superficial neurovascular structures were well padded.  An initial time-out

was done with the surgeon, anesthesia staff, OR staff, all in agreement

with the patient, procedure being done, and the extremity being operated

on.  General anesthesia was administered without difficulty or

complication.  A tourniquet was placed high on the right thigh and set to

300 mmHg.  General anesthesia was administered without difficulty or

complication and examination under anesthesia was then carried out.



EXAMINATION UNDER ANESTHESIA:  Right knee with no warmth, no swelling,

erythema, skin intact, range of motion mildly limited with 3 degrees to 5

degrees flexion contracture with inability to achieve full extension,

overall varus deformity, full flexion achieved compared to contralateral

knee, no evidence of instability, significant crepitance throughout range

of motion, patella with normal tracking but significant crepitance as well,

negative anterior drawer, negative posterior drawer, negative Lachman,

negative reverse Lachman, negative pivot shift, negative reverse pivot

shift, negative opening to medial or lateral joint line at 0 to 30 degrees

varus or valgus stress.



CONTINUATION OF PROCEDURE:  The right lower extremity was prepped and

draped in a standard sterile fashion.  The right lower extremity was then

exsanguinated and the tourniquet was inflated after a final time-out was

done with the surgeon, anesthesia staff, OR staff, and all in agreement

with the patient, procedure being done and the extremity being operated on.

Once again the right lower extremity was exsanguinated and the tourniquet

was inflated to 300 mmHg for  a total tourniquet time of 98 minutes. 

Incision was made to the skin, down to subcutaneous tissue while

maintaining good hemostasis as a medial parapatellar approach starting two

fingerbreadths above the level of the superior pole of the patella down to

the tibial tuberosity.  Medial retinaculum was exposed and overlying soft

tissue elevated.  Sharp incision was made as the medial aspect of the

quadriceps tendon in line with the fibers down to the superior pole of the

patella, around the patella, down to the patellar tendon in line with the

patellar tendon fibers down to the tibial tuberosity.  Once the tibial

tuberosity was exposed, I began to notice that there was a partial patellar

tendon tear/avulsion injury at the tibial tuberosity that would require

further attention and for us to exercise caution throughout the procedure

as not to complete the patellar tendon tear.  This partial patellar tendon

tear accomplished approximately 50% of the patellar tendon insertion at the

tibial tuberosity.  An extensive synovectomy was then carried out after a

medial release was carried out as this was a varus deformity.  Once the

medial release was carried out releasing the deep MCL and medial

periosteum, as well as the medial osteophytes, then attention was turned

towards preparation of the patella.  The patella was everted sideways with

caution taken not to complete the patella tendon tear.  With the use of the

Medacta guide, the patella cut was made with the use of a saw blade

establishing a smooth surface.  With the use of rongeur, the osteophytes

around the patella were resected and patella preparation will be completed

towards the end of the case.  We then turned our attention to the distal

femur after the ACL and PCL were released.  The contact points for the

custom cutting guides were identified and the overlying cartilage was

debrided.  The distal femur custom cutting block was then pinned into

position and the distal femoral cut was made.  A 4-in-1 guide was attached

with good external rotation down into the system approximately 3 degrees

and the distal cuts were completed being the anterior and posterior and the

anterior chamfer and posterior chamfer cuts.  A trial of size #5 femur was

then impacted into position and had a good fit.  Trial was removed and the

distal femur preparation was completed with the use of the reamer and the

Medacta final preparation guide to place the tunnel for the sphere system. 

Once the distal femur preparation was completed, attention was then turned

towards the tibia.  Proximal tibia cuts and cutting guide contact points

were debrided and the guide was pinned into position.  Alignment guide

confirmed that neutral alignment was established.  Proximal tibia cut was

made and the plateau was resected and all remnant soft tissue including

meniscus and synovium were resected.  The proximal tibia reaming was

completed and attention was then turned towards the patella.  Patella peg

holes were completed and all cut surfaces were copiously irrigated.  Trial

implants with a size #5 distal femur and size #5 proximal tibia were then

trialed and had good fit with good patellar tracking.  Once this was

established that these were final our sizes, trial implants were removed

and all cut surfaces were copiously irrigated and patted dry.  Biomet

cobalt cement was then mixed and proper cement technique was used to place

final implants consisting of size 5 cemented tibial base plate, size 5

cemented distal femur, size 2 cemented patellar button and a size 5 of 14

mm polyethelene spacer sphere.  Once final implants were in good position,

good stability was confirmed with no soft tissue imbalances and good

mid-flexion stability with full extension and flexion achieved.  Once it

was confirmed with final implants in position and the cement hardened, the

tourniquet was deflated for a total tourniquet time of 98 minutes and good

hemostasis was achieved.  Attention then turned towards repairing the

patella tendon partial tear with the use of two 4.75 BioComposite SwiveLock

anchors from [employer], two docking sites for the anchors directly below the

patellar tendon were identified and drilled with a tap.  The anchors were

placed, preloaded with suture.  The suture was passed through the avulsed

patellar tendon and tied down directly over each other.  Then, those

sutures were tied down to each other creating a stable primary patellar

tendon repair.  Once this was completed with satisfaction, the wounds were

copiously irrigated and local anesthetic was placed at the posterior

capsule in superior and inferior aspect of the wounds.  Alternating #2

FiberWire suture and #1 Vicryl suture were used for reapproximation and a

retinacular repair.  Subcutaneous tissue and deep tissue were

reapproximated with #1 Vicryl suture followed by 2-0 Vicryl suture for

subcutaneous tissue followed by staples for skin.  Sterile dressings were

applied followed by a layer of sterile cast padding from the toes up to the

superior thigh, followed by a layer of compressive Ace wrap from the toes

up to the superior thigh.  A Hemovac drain had been placed as well.  The

drain was placed prior to wound closure.  A knee immobilizer was then

fitted and placed for the patient's knee.  Once the knee immobilizer was in

position and the knee was in full extension and all dressings and wraps

were in place, the patient was then extubated from general anesthesia and

transferred to PACU in stable condition and tolerated the procedure well.



DISPOSITION:  The patient will remain as an inpatient under the observation

of the primary medical team for 24 to 48 hours.  She will work with

physical therapy, case management, and  to determine optimal

placement for her which would be a rehab facility versus home with services

depending on her home situation and progression with rehab.  She received

adequate pain control.  She will be started on DVT prophylaxis in the form

of 40 mg subcutaneous Lovenox injection once daily, starting postoperative

day #1.  She will be monitored by my team and I will follow her as an

inpatient until she is discharged to rehab facility.  Once she is

discharged, she will contact my office and we will arrange for early

followup in the office.  She will contact me directly with any questions or

concerns.





__________________________________________

Aura Sanchez MD



DD:  02/15/2018 9:47:09

DT:  02/15/2018 15:40:40

Job # [id number]

## 2018-03-06 ENCOUNTER — HOSPITAL ENCOUNTER (INPATIENT)
Dept: HOSPITAL 31 - C.ER | Age: 73
LOS: 3 days | Discharge: HOME | DRG: 281 | End: 2018-03-09
Attending: INTERNAL MEDICINE | Admitting: INTERNAL MEDICINE
Payer: MEDICARE

## 2018-03-06 VITALS — BODY MASS INDEX: 27.3 KG/M2

## 2018-03-06 DIAGNOSIS — I50.30: ICD-10-CM

## 2018-03-06 DIAGNOSIS — E78.5: ICD-10-CM

## 2018-03-06 DIAGNOSIS — I11.0: ICD-10-CM

## 2018-03-06 DIAGNOSIS — I25.110: ICD-10-CM

## 2018-03-06 DIAGNOSIS — I21.4: Primary | ICD-10-CM

## 2018-03-06 DIAGNOSIS — K21.9: ICD-10-CM

## 2018-03-06 DIAGNOSIS — J44.9: ICD-10-CM

## 2018-03-06 DIAGNOSIS — F17.200: ICD-10-CM

## 2018-03-06 DIAGNOSIS — Z96.651: ICD-10-CM

## 2018-03-06 LAB
ALBUMIN SERPL-MCNC: 3.5 G/DL (ref 3.5–5)
ALBUMIN/GLOB SERPL: 1.1 {RATIO} (ref 1–2.1)
ALT SERPL-CCNC: 39 U/L (ref 9–52)
AST SERPL-CCNC: 46 U/L (ref 14–36)
BASOPHILS # BLD AUTO: 0 K/UL (ref 0–0.2)
BASOPHILS NFR BLD: 1 % (ref 0–2)
BUN SERPL-MCNC: 26 MG/DL (ref 7–17)
CALCIUM SERPL-MCNC: 9.4 MG/DL (ref 8.6–10.4)
CK MB SERPL-MCNC: 1.3 NG/ML (ref 0–3.38)
EOSINOPHIL # BLD AUTO: 0.4 K/UL (ref 0–0.7)
EOSINOPHIL NFR BLD: 7.5 % (ref 0–4)
ERYTHROCYTE [DISTWIDTH] IN BLOOD BY AUTOMATED COUNT: 16.3 % (ref 11.5–14.5)
GFR NON-AFRICAN AMERICAN: 49
HGB BLD-MCNC: 11.6 G/DL (ref 11–16)
LYMPHOCYTES # BLD AUTO: 1.3 K/UL (ref 1–4.3)
LYMPHOCYTES NFR BLD AUTO: 26.7 % (ref 20–40)
MCH RBC QN AUTO: 27.5 PG (ref 27–31)
MCHC RBC AUTO-ENTMCNC: 32.5 G/DL (ref 33–37)
MCV RBC AUTO: 84.7 FL (ref 81–99)
MONOCYTES # BLD: 0.6 K/UL (ref 0–0.8)
MONOCYTES NFR BLD: 11.6 % (ref 0–10)
NEUTROPHILS # BLD: 2.6 K/UL (ref 1.8–7)
NEUTROPHILS NFR BLD AUTO: 53.2 % (ref 50–75)
NRBC BLD AUTO-RTO: 0 % (ref 0–2)
PLATELET # BLD: 183 K/UL (ref 130–400)
PMV BLD AUTO: 9.6 FL (ref 7.2–11.7)
RBC # BLD AUTO: 4.23 MIL/UL (ref 3.8–5.2)
TROPONIN I SERPL-MCNC: 0.16 NG/ML (ref 0–0.12)
TROPONIN I SERPL-MCNC: 0.22 NG/ML (ref 0–0.12)
WBC # BLD AUTO: 4.9 K/UL (ref 4.8–10.8)

## 2018-03-06 NOTE — CP.PCM.CON
History of Present Illness





- History of Present Illness


History of Present Illness: 


Reason For Consultation: Non St elevation MI





History Of Present Illness


71 y/o female with a past medical history of hypertension, GERD, and COPD, 

presents to the ED complaining of burning in the epigastric region since last 

night. She also began feeling lightheaded this morning. Of note patient 

recently had right knee replacement surgery with Dr. Mccallum and was 

discharged home from the nursing home this Sunday. Reports she was given Pepcid 

and Milanta while in the nursing home with relief of symptoms. Yesterday she 

called her GI specialist and was prescribed Zantac, which made her feel 

increasingly nauseous. Patient had follow up with Dr. Mccallum yesterday and 

states her knee is healing well. 





PMD: Dr. Eugene Frias 





Chief Complaint (Nursing): Dizziness/Lightheaded


History Per: Patient


History/Exam Limitations: no limitations


Onset/Duration Of Symptoms: Days (x1)


Current Symptoms Are (Timing): Still Present


Fall Associated With With Symptoms: No








- Medical History


PMH: Arthritis, Bronchitis, COPD, GERD, HTN


   Denies: Chronic Kidney Disease


Other Surgeries: Right knee replacement surgery





- [facility] Procedures








 (01/31/18)


CYSTOCELE REPAIR (07/06/98)


CYSTOGRAM NEC (04/27/98)


CYSTOMETROGRAM (04/27/98)


CYSTOSCOPY NEC (07/06/98)


OTHER CYSTOSTOMY (07/06/98)


SUPRAPUBIC SLING OP (07/06/98)








Family History: States: Unknown Family Hx





- Social History


Hx Tobacco Use: No


Hx Alcohol Use: No


Hx Substance Use: No





- Immunization History


Hx Tetanus Toxoid Vaccination: Yes


Hx Influenza Vaccination: Yes (1/2018)


Hx Pneumococcal Vaccination: Yes (1/2018)





Review Of Systems


Except As Marked, All Systems Reviewed And Found Negative.


Cardiovascular: Positive for: Light Headedness.  Negative for: Chest Pain


Respiratory: Negative for: Shortness of Breath


Gastrointestinal: Positive for: Nausea, Abdominal Pain (burning sensation in 

epigastrium).  Negative for: Vomiting, Diarrhea


Neurological: Negative for: Weakness, Numbness, Other (syncope)





Physical Exam





- Physical Exam


Appears: Non-toxic, No Acute Distress


Skin: Normal Color, Warm, Dry


Head: Atraumatic, Normacephalic


Eye(s): bilateral: Normal Inspection, PERRL, EOMI


Nose: Normal


Oral Mucosa: Moist


Neck: Normal ROM, Supple


Chest: Symmetrical


Cardiovascular: Rhythm Regular


Respiratory: Normal Breath Sounds, No Accessory Muscle Use


Gastrointestinal/Abdominal: Soft, Tenderness (mid epigastric discomfort but no 

palpable tenderness), No Guarding, No Rebound


Extremity: No Tenderness, No Pedal Edema, Other (ACE wrap in place at right knee

)


Neurological/Psych: Oriented x3, Normal Speech





Past Patient History





- Infectious Disease


Hx of Infectious Diseases: None





- Past Social History


Smoking Status: Never Smoked





- CARDIAC


Hx Hypertension: Yes





- PULMONARY


Hx Bronchitis: Yes


Hx Chronic Obstructive Pulmonary Disease (COPD): Yes





- NEUROLOGICAL


Hx Neurological Disorder: No





- HEENT


Hx HEENT Problems: No





- RENAL


Hx Chronic Kidney Disease: No





- ENDOCRINE/METABOLIC


Hx Endocrine Disorders: No





- HEMATOLOGICAL/ONCOLOGICAL


Hx Blood Disorders: No


Hx Blood Transfusions: No





- INTEGUMENTARY


Hx Dermatological Problems: No





- MUSCULOSKELETAL/RHEUMATOLOGICAL


Hx Falls: No





- GASTROINTESTINAL


Hx Gastrointestinal Disorders: Yes


Hx Gastroesophageal Reflux: Yes





- GENITOURINARY/GYNECOLOGICAL


Hx Genitourinary Disorders: No





- PSYCHIATRIC


Hx Substance Use: No





- SURGICAL HISTORY


Hx Surgeries: Yes


Hx Orthopedic Surgery: Yes (Right knee replacement)





- ANESTHESIA


Hx Anesthesia: Yes


Hx Anesthesia Reactions: No


Hx Malignant Hyperthermia: No


Has any member of the family had a problem w/ anesthesia?: No





Meds


Home Medications: 


 Home Medication List











 Medication  Instructions  Recorded  Confirmed  Type


 


Aspirin [Adult Aspirin Regimen] 81 mg PO DAILY #90 tablet. 03/09/18  Rx


 


Clopidogrel [Plavix] 75 mg PO DAILY #90 tab 03/09/18  Rx


 


Metoprolol Succinate [Toprol XL] 50 mg PO DAILY #90 tab 03/09/18  Rx


 


Rosuvastatin Calcium [Crestor] 20 mg PO HS #90 tablet 03/09/18  Rx











Allergies/Adverse Reactions: 


 Allergies











Allergy/AdvReac Type Severity Reaction Status Date / Time


 


No Known Allergies Allergy   Verified 03/06/18 09:43














- Medications


Medications: 


 Current Medications





Acetaminophen (Tylenol 325mg Tab)  650 mg PO Q6 PRN


   PRN Reason: Pain, Mild (1-3)


   Last Admin: 03/06/18 21:21 Dose:  650 mg


Amlodipine Besylate (Norvasc)  10 mg PO DAILY [facility]


Aspirin (Aspirin Chewable)  81 mg PO DAILY [facility]


Heparin Sodium (Porcine) (Heparin)  5,000 units SC Q8 [facility]


   Last Admin: 03/06/18 21:21 Dose:  5,000 units


Losartan Potassium (Cozaar)  100 mg PO DAILY [facility]


Oxycodone/Acetaminophen (Percocet 5/325 Mg Tab)  1 tab PO Q4H PRN


   PRN Reason: Pain


   Stop: 03/09/18 21:15


Pantoprazole Sodium (Protonix Ec Tab)  40 mg PO DAILY [facility]


Rosuvastatin Calcium (Crestor)  10 mg PO HS [facility]


   Last Admin: 03/06/18 21:21 Dose:  10 mg


Fluticasone/Salmeterol (Advair Diskus 250/50)  1 puff IH RBID [facility]


Tiotropium Bromide (Spiriva)  18 mcg INH RQ24 [facility]











Results





- Vital Signs


Recent Vital Signs: 


 Last Vital Signs











Temp  97.8 F   03/06/18 19:26


 


Pulse  81   03/06/18 19:26


 


Resp  20   03/06/18 19:26


 


BP  160/90 H  03/06/18 19:26


 


Pulse Ox  100   03/06/18 19:26














- Labs


Result Diagrams: 


 03/09/18 07:35





 03/09/18 07:35


Labs: 


 Laboratory Results - last 24 hr











  03/06/18 03/06/18 03/06/18





  10:22 10:22 13:40


 


WBC  4.9  


 


RBC  4.23  


 


Hgb  11.6  D  


 


Hct  35.8  


 


MCV  84.7  


 


MCH  27.5  


 


MCHC  32.5 L  


 


RDW  16.3 H  


 


Plt Count  183  


 


MPV  9.6  


 


Neut % (Auto)  53.2  


 


Lymph % (Auto)  26.7  


 


Mono % (Auto)  11.6 H  


 


Eos % (Auto)  7.5 H  


 


Baso % (Auto)  1.0  


 


Neut # (Auto)  2.6  


 


Lymph # (Auto)  1.3  


 


Mono # (Auto)  0.6  


 


Eos # (Auto)  0.4  


 


Baso # (Auto)  0.0  


 


APTT    35 H


 


Sodium   142 


 


Potassium   4.2 


 


Chloride   104 


 


Carbon Dioxide   27 


 


Anion Gap   16 


 


BUN   26 H 


 


Creatinine   1.1 


 


Est GFR ( Amer)   59 


 


Est GFR (Non-Af Amer)   49 


 


Random Glucose   104 


 


Calcium   9.4 


 


Total Bilirubin   0.3 


 


AST   46 H D 


 


ALT   39 


 


Alkaline Phosphatase   75 


 


Total Creatine Kinase   


 


CK-MB (Mass)   


 


Troponin I   0.1580 H* 


 


NT-Pro-B Natriuret Pep   2380 H 


 


Total Protein   6.8 


 


Albumin   3.5 


 


Globulin   3.3 


 


Albumin/Globulin Ratio   1.1 














  03/06/18





  17:35


 


WBC 


 


RBC 


 


Hgb 


 


Hct 


 


MCV 


 


MCH 


 


MCHC 


 


RDW 


 


Plt Count 


 


MPV 


 


Neut % (Auto) 


 


Lymph % (Auto) 


 


Mono % (Auto) 


 


Eos % (Auto) 


 


Baso % (Auto) 


 


Neut # (Auto) 


 


Lymph # (Auto) 


 


Mono # (Auto) 


 


Eos # (Auto) 


 


Baso # (Auto) 


 


APTT 


 


Sodium 


 


Potassium 


 


Chloride 


 


Carbon Dioxide 


 


Anion Gap 


 


BUN 


 


Creatinine 


 


Est GFR ( Amer) 


 


Est GFR (Non-Af Amer) 


 


Random Glucose 


 


Calcium 


 


Total Bilirubin 


 


AST 


 


ALT 


 


Alkaline Phosphatase 


 


Total Creatine Kinase  75


 


CK-MB (Mass)  1.30


 


Troponin I  0.2170 H*


 


NT-Pro-B Natriuret Pep 


 


Total Protein 


 


Albumin 


 


Globulin 


 


Albumin/Globulin Ratio 














Assessment & Plan





- Assessment and Plan (Free Text)


Assessment: 





Unstable angina


-EKG 3/6/18 - Normal sinus rhythm, Possible Left atrial enlargement, Inferior 

infarct, age undetermined, T wave abnormality, consider anterior ischemia, 

Abnormal ECG


-CXR - negative


-Troponin #1 (+)


-BNP 2380


-f/u Echo


-Prior Stress test 12/2017 - normal SPECT myocardial perfusion study, fixed 

anteroseptal / apical defects likely 2/2 breast -attenuation; normal gated wall 

motion of L ventricle.


-f/u BERNABE's x2


-f/u EKG x2


-oxygen via NC 2L


-Tylenol 650mg PO Q6H PRN pain


-ASA 81mg


-Crestor 10mg PO HS





GERD


- Protonix 40mg PO qD


- f/u stool H. Pylori - patient reports she completed her prior tx of 

antibiotics + omeprazole.





HTN


- Continue home medications: amlodipine and cozaar


- will continue to monitor 


-f/u A1c, TSH, Free T4, Lipids





COPD


- Continue Advair and spiriva





Prophylaxis


- protonix 40mg PO qD


- Heparin 5000u SC Q8H


- SCDs





Cardiac cath tomorrow

## 2018-03-06 NOTE — C.PDOC
History Of Present Illness


71 y/o female with a past medical history of hypertension, GERD, and COPD, 

presents to the ED complaining of burning in the epigastric region since last 

night. She also began feeling lightheaded this morning. Of note patient 

recently had right knee replacement surgery with Dr. Mccallum and was 

discharged home from the nursing home this . Reports she was given Pepcid 

and Milanta while in the nursing home with relief of symptoms. Yesterday she 

called her GI specialist and was prescribed Zantac, which made her feel 

increasingly nauseous. Patient had follow up with Dr. Mccallum yesterday and 

states her knee is healing well. 





PMD: Dr. Eugene Frias 


Time Seen by Provider: 18 09:54


Chief Complaint (Nursing): Dizziness/Lightheaded


History Per: Patient


History/Exam Limitations: no limitations


Onset/Duration Of Symptoms: Days (x1)


Current Symptoms Are (Timing): Still Present


Fall Associated With With Symptoms: No





Past Medical History


Reviewed: Historical Data, Nursing Documentation, Vital Signs


Vital Signs: 


 Last Vital Signs











Temp  98.1 F   18 09:49


 


Pulse  78   18 10:45


 


Resp  17   18 10:45


 


BP  138/84   18 10:45


 


Pulse Ox  97   18 11:29














- Medical History


PMH: Arthritis, Bronchitis, COPD, GERD, HTN


   Denies: Chronic Kidney Disease


Other Surgeries: Right knee replacement surgery





- [facility] Procedures








 (18)


CYSTOCELE REPAIR (98)


CYSTOGRAM NEC (98)


CYSTOMETROGRAM (98)


CYSTOSCOPY NEC (98)


OTHER CYSTOSTOMY (98)


SUPRAPUBIC SLING OP (98)








Family History: States: Unknown Family Hx





- Social History


Hx Tobacco Use: No


Hx Alcohol Use: No


Hx Substance Use: No





- Immunization History


Hx Tetanus Toxoid Vaccination: Yes


Hx Influenza Vaccination: Yes (2018)


Hx Pneumococcal Vaccination: Yes (2018)





Review Of Systems


Except As Marked, All Systems Reviewed And Found Negative.


Cardiovascular: Positive for: Light Headedness.  Negative for: Chest Pain


Respiratory: Negative for: Shortness of Breath


Gastrointestinal: Positive for: Nausea, Abdominal Pain (burning sensation in 

epigastrium).  Negative for: Vomiting, Diarrhea


Neurological: Negative for: Weakness, Numbness, Other (syncope)





Physical Exam





- Physical Exam


Appears: Non-toxic, No Acute Distress


Skin: Normal Color, Warm, Dry


Head: Atraumatic, Normacephalic


Eye(s): bilateral: Normal Inspection, PERRL, EOMI


Nose: Normal


Oral Mucosa: Moist


Neck: Normal ROM, Supple


Chest: Symmetrical


Cardiovascular: Rhythm Regular


Respiratory: Normal Breath Sounds, No Accessory Muscle Use


Gastrointestinal/Abdominal: Soft, Tenderness (mid epigastric discomfort but no 

palpable tenderness), No Guarding, No Rebound


Extremity: No Tenderness, No Pedal Edema, Other (ACE wrap in place at right knee

)


Neurological/Psych: Oriented x3, Normal Speech





ED Course And Treatment





- Laboratory Results


Result Diagrams: 


 18 10:22





 18 10:22


ECG: Interpreted By Me, Viewed By Me


Interpretation Of ECG: Normal sinus rhythm with flipped Ts in all lateral 

leads and Qs in the inferior in leads, no ST elevations/depressions.


Rate From EC


O2 Sat by Pulse Oximetry: 97 (RA)


Pulse Ox Interpretation: Normal





- Radiology


CXR: Interpreted by Me, Viewed By Me


CXR Interpretation: Yes: No Acute Disease





Medical Decision Making


Medical Decision Making: 





Time: 10:04


Initial Plan:


* EKG


* Pro BNP


* Troponin I


* CMP


* CBC


* Chest x-ray


* Maalox Plus 30 ml PO


* Pepcid 20 mg IVP


* Reevaluation





Labs reviewed, (+) troponin. Pro BNP elevated, 2380.





Patient has doctor at [place] but not [facility] Dr. Tipton saw her in the 

nursing home following recent surgery, so she requests Dr. Tipton care for her 

during admission. 





Time: 11:24


Spoke with Dr. Tipton who accepts patient for admission for ACS. Patient will 

be hospitalized as observation. Care transferred to Dr. Tipton at this time.  





Disposition


Discussed With : Mariajose Tipton


Doctor Will See Patient In The: Hospital


Counseled Patient/Family Regarding: Studies Performed, Diagnosis





- Disposition


Disposition: HOSPITALIZED


Disposition Time: 11:24


Condition: GUARDED


Forms:  [facility] (English)





- POA


Present On Arrival: None





- Clinical Impression


Clinical Impression: 


 Unstable angina








- Scribe Statement


The provider has reviewed the documentation as recorded by the Scribe (Hazel Morales)


Provider Attestation: 





All medical record entries made by the Scribe were at my direction and 

personally dictated by me. I have reviewed the chart and agree that the record 

accurately reflects my personal performance of the history, physical exam, 

medical decision making, and the department course for this patient. I have 

also personally directed, reviewed, and agree with the discharge instructions 

and disposition.





Decision To Admit





- Pt Status Changed To:


Hospital Disposition Of: Observation





- .


Bed Request Type: Telemetry


Patient Diagnosis: 


 Unstable angina

## 2018-03-06 NOTE — CP.PCM.PN
Subjective





- Date & Time of Evaluation


Date of Evaluation: 03/06/18


Time of Evaluation: 12:00





- Subjective


Subjective: 





PGY2 Resident - Medicine Progress Note for Dr. Tipton.





This 72 year old female with PMHx of GERD, COPD, HTN, s/p right TKR - presents 

to the ED complaining of burning in the epigastric region since last night. 

This morning, she also began to feel lightheaded. She recently had a right TKR 

with Dr. Mccallum and was discharged home from the nursing home this past 

[place] 3/4/18. She states she was Rx'd Pepcid and Milanta while in the nursing 

home, which helped relieve her symptoms. She called her GI specialist yesterday 

and was Rx'd Zantac, however she could not tolerate it as it made her nauseous. 

She followed up with Dr. Mccallum yesterday, and reports her knee is healing 

well. She denies f/c, chest pain, SOB, numbness / tinging of the upper or lower 

extremities, overt abdominal pain, LE edema, or dysuria. 12-point review of 

systems is otherwise negative without any additional acute complaints.





PMHx: GERD, COPD, HTN, s/p right TKR


PSHx: right TKR


Social: denies tobacco, ETOH or drug use


PMD: Dr. Eugene Frias 








Objective





- Vital Signs/Intake and Output


Vital Signs (last 24 hours): 


 











Temp Pulse Resp BP Pulse Ox


 


 98.1 F   76   18   142/77   98 


 


 03/06/18 09:49  03/06/18 11:31  03/06/18 11:31  03/06/18 11:31  03/06/18 11:31











- Labs


Labs: 


 





 03/06/18 10:22 





 03/06/18 10:22 











- Additional Findings


Additional findings: 





- Constitutional


Appears: Non-toxic, No Acute Distress





- Head Exam


Head Exam: ATRAUMATIC





- ENT Exam


ENT Exam: Mucous Membranes Moist





- Respiratory Exam


Respiratory Exam: Clear to Ausculation Bilateral.  absent: Accessory Muscle Use

, Rhonchi, Wheezes, Respiratory Distress





- Cardiovascular Exam


Cardiovascular Exam: REGULAR RHYTHM, +S1, +S2





- GI/Abdominal Exam


GI & Abdominal Exam: Soft, Normal Bowel Sounds.  absent: Distended, Firm, 

Guarding, Rigid, Tenderness, Organomegaly


- Note: patient has mid epigastric discomfort / burning, with no palpable 

tenderness





- Extremities Exam


Extremities Exam: absent: Calf Tenderness, Pedal Edema, Tenderness


-Note: ACE wrap in place at right knee





- Neurological Exam


Neurological Exam: Alert, Awake, Oriented x3





- Psychiatric Exam


Psychiatric exam: Normal Affect, Normal Mood





- Skin


Skin Exam: Dry, Intact, Normal Color, Warm








Assessment and Plan





- Assessment and Plan (Free Text)


Assessment: 





Unstable angina


-EKG 3/6/18 - Normal sinus rhythm, Possible Left atrial enlargement, Inferior 

infarct, age undetermined, T wave abnormality, consider anterior ischemia, 

Abnormal ECG


-CXR - negative


-Troponin #1 (+)


-Cardiology consult, Dr. Carrington, f/u recs


-BNP 2380


-f/u Echo


-Prior Stress test 12/2017 - normal SPECT myocardial perfusion study, fixed 

anteroseptal / apical defects likely 2/2 breast -attenuation; normal gated wall 

motion of L ventricle.


-f/u BERNABE's x2


-f/u EKG x2


-oxygen via NC 2L


-Tylenol 650mg PO Q6H PRN pain


-ASA 81mg


-Crestor 10mg PO HS





GERD


- Protonix 40mg PO qD


- f/u stool H. Pylori - patient reports she completed her prior tx of 

antibiotics + omeprazole.





HTN


- Continue home medications: amlodipine and cozaar


- will continue to monitor 


-f/u A1c, TSH, Free T4, Lipids





COPD


- Continue Advair and spiriva





Prophylaxis


- protonix 40mg PO qD


- Heparin 5000u SC Q8H


- SCDs








Case discussed with attending. All medical management as per Dr. SAVITA Tipton.

## 2018-03-06 NOTE — RAD
PROCEDURE:  CHEST RADIOGRAPH, 1 VIEW



HISTORY:

Chest pain 



COMPARISON:

None available.



FINDINGS:



LUNGS:

The lungs are clear.



PLEURA:

No pneumothorax or pleural fluid seen.



CARDIOVASCULAR:

Normal.



OSSEOUS STRUCTURES:

No significant abnormalities.



VISUALIZED UPPER ABDOMEN:

Normal.



OTHER FINDINGS:

None. 



IMPRESSION:

No active pulmonary disease.

## 2018-03-07 LAB
ALBUMIN SERPL-MCNC: 3.3 G/DL (ref 3.5–5)
ALBUMIN/GLOB SERPL: 1.1 {RATIO} (ref 1–2.1)
ALT SERPL-CCNC: 45 U/L (ref 9–52)
APTT BLD: 58 SECONDS (ref 21–34)
AST SERPL-CCNC: 42 U/L (ref 14–36)
BASOPHILS # BLD AUTO: 0.1 K/UL (ref 0–0.2)
BASOPHILS NFR BLD: 2.1 % (ref 0–2)
BUN SERPL-MCNC: 22 MG/DL (ref 7–17)
CALCIUM SERPL-MCNC: 9.2 MG/DL (ref 8.6–10.4)
CK MB SERPL-MCNC: 1.08 NG/ML (ref 0–3.38)
EOSINOPHIL # BLD AUTO: 0.5 K/UL (ref 0–0.7)
EOSINOPHIL NFR BLD: 12.5 % (ref 0–4)
ERYTHROCYTE [DISTWIDTH] IN BLOOD BY AUTOMATED COUNT: 16.1 % (ref 11.5–14.5)
GFR NON-AFRICAN AMERICAN: 55
HDLC SERPL-MCNC: 42 MG/DL (ref 30–70)
HGB BLD-MCNC: 10.9 G/DL (ref 11–16)
INR PPP: 1.2
LDLC SERPL-MCNC: 53 MG/DL (ref 0–129)
LYMPHOCYTES # BLD AUTO: 1.6 K/UL (ref 1–4.3)
LYMPHOCYTES NFR BLD AUTO: 39.8 % (ref 20–40)
MCH RBC QN AUTO: 27.7 PG (ref 27–31)
MCHC RBC AUTO-ENTMCNC: 32.8 G/DL (ref 33–37)
MCV RBC AUTO: 84.3 FL (ref 81–99)
MONOCYTES # BLD: 0.4 K/UL (ref 0–0.8)
MONOCYTES NFR BLD: 11 % (ref 0–10)
NEUTROPHILS # BLD: 1.4 K/UL (ref 1.8–7)
NEUTROPHILS NFR BLD AUTO: 34.6 % (ref 50–75)
NRBC BLD AUTO-RTO: 0 % (ref 0–2)
PLATELET # BLD: 176 K/UL (ref 130–400)
PMV BLD AUTO: 9.4 FL (ref 7.2–11.7)
PROTHROMBIN TIME: 12.9 SECONDS (ref 9.7–12.2)
RBC # BLD AUTO: 3.94 MIL/UL (ref 3.8–5.2)
TROPONIN I SERPL-MCNC: 0.16 NG/ML (ref 0–0.12)
WBC # BLD AUTO: 4 K/UL (ref 4.8–10.8)

## 2018-03-07 PROCEDURE — 4A023N7 MEASUREMENT OF CARDIAC SAMPLING AND PRESSURE, LEFT HEART, PERCUTANEOUS APPROACH: ICD-10-PCS | Performed by: INTERNAL MEDICINE

## 2018-03-07 PROCEDURE — B2111ZZ FLUOROSCOPY OF MULTIPLE CORONARY ARTERIES USING LOW OSMOLAR CONTRAST: ICD-10-PCS | Performed by: INTERNAL MEDICINE

## 2018-03-07 RX ADMIN — FLUTICASONE PROPIONATE AND SALMETEROL SCH INHALER: 50; 250 POWDER RESPIRATORY (INHALATION) at 08:19

## 2018-03-07 RX ADMIN — FLUTICASONE PROPIONATE AND SALMETEROL SCH INHALER: 50; 250 POWDER RESPIRATORY (INHALATION) at 21:02

## 2018-03-07 RX ADMIN — PANTOPRAZOLE SODIUM SCH MG: 40 TABLET, DELAYED RELEASE ORAL at 09:09

## 2018-03-07 RX ADMIN — TIOTROPIUM BROMIDE SCH MCG: 18 CAPSULE ORAL; RESPIRATORY (INHALATION) at 08:19

## 2018-03-07 NOTE — CP.PCM.PN
Subjective





- Date & Time of Evaluation


Date of Evaluation: 03/07/18


Time of Evaluation: 12:47





- Subjective


Subjective: 





Patient s/p recent MI





Cath findings:





1. L Main: Patent


2. LAD: Mid 70% stenosis


3. L Cx: Mid 90-95% stenosis


4. RCA: Mid 100%  (Right to Right and Left to Right collaterals


5. EF: 70%, No WMA, EDP 18





A/P: Triple vessel disease


CABG Vs. Multi vessel PCI


Will d/w patient and family





Objective





- Vital Signs/Intake and Output


Vital Signs (last 24 hours): 


 











Temp Pulse Resp BP Pulse Ox


 


 97.2 F L  66   20   181/87 H  96 


 


 03/07/18 08:40  03/07/18 09:08  03/07/18 08:40  03/07/18 09:08  03/07/18 08:40











- Medications


Medications: 


 Current Medications





Acetaminophen (Tylenol 325mg Tab)  650 mg PO Q6 PRN


   PRN Reason: Pain, Mild (1-3)


   Last Admin: 03/06/18 21:21 Dose:  650 mg


Amlodipine Besylate (Norvasc)  10 mg PO DAILY [facility]


   Last Admin: 03/07/18 09:09 Dose:  10 mg


Aspirin (Aspirin Chewable)  81 mg PO DAILY [facility]


   Last Admin: 03/07/18 09:10 Dose:  81 mg


Heparin Sodium (Porcine) (Heparin)  5,000 units SC Q8 [facility]


   Last Admin: 03/07/18 05:51 Dose:  5,000 units


Sodium Chloride (Sodium Chloride 0.45%)  500 mls @ 70 mls/hr IV .Q7H9M [facility]


   Stop: 03/08/18 07:30


Losartan Potassium (Cozaar)  100 mg PO DAILY [facility]


   Last Admin: 03/07/18 09:10 Dose:  100 mg


Metoprolol Tartrate (Lopressor)  25 mg PO BID [facility]


Oxycodone/Acetaminophen (Percocet 5/325 Mg Tab)  1 tab PO Q4H PRN


   PRN Reason: Pain


   Stop: 03/09/18 21:15


Pantoprazole Sodium (Protonix Ec Tab)  40 mg PO DAILY [facility]


   Last Admin: 03/07/18 09:09 Dose:  40 mg


Rosuvastatin Calcium (Crestor)  10 mg PO HS [facility]


   Last Admin: 03/06/18 21:21 Dose:  10 mg


Fluticasone/Salmeterol (Advair Diskus 250/50)  1 puff IH RBID [facility]


   Last Admin: 03/07/18 08:19 Dose:  1 inhaler


Tiotropium Bromide (Spiriva)  18 mcg INH RQ24 [facility]


   Last Admin: 03/07/18 08:19 Dose:  18 mcg











- Labs


Labs: 


 





 03/07/18 07:17 





 03/07/18 07:17 





 











PT  12.9 SECONDS (9.7-12.2)  H  03/07/18  07:17    


 


INR  1.2   03/07/18  07:17    


 


APTT  58 SECONDS (21-34)  H D 03/07/18  07:17

## 2018-03-07 NOTE — CP.PCM.PN
Subjective





- Date & Time of Evaluation


Date of Evaluation: 03/07/18


Time of Evaluation: 07:00





- Subjective


Subjective: 





PGY2 Resident - Medicine Progress Note for Dr. Tipton.





Patient seen and examined at bedside. Resting comfortably, no acute distress. 

She reports her epigastric / esophageal burning sensation has resolved. Denies 

any chest pain, numbness/ tingling of the extremities, n/v, d/c, or any 

additional complaints. She is going for a cardiac cath with Dr. Carrington later 

today. 12-point review of systems is otherwise negative without any additional 

acute complaints.





Objective





- Vital Signs/Intake and Output


Vital Signs (last 24 hours): 


 











Temp Pulse Resp BP Pulse Ox


 


 97.2 F L  79   20   174/90 H  96 


 


 03/07/18 08:40  03/07/18 08:40  03/07/18 08:40  03/07/18 08:40  03/07/18 08:40











- Medications


Medications: 


 Current Medications





Acetaminophen (Tylenol 325mg Tab)  650 mg PO Q6 PRN


   PRN Reason: Pain, Mild (1-3)


   Last Admin: 03/06/18 21:21 Dose:  650 mg


Amlodipine Besylate (Norvasc)  10 mg PO DAILY [facility]


Aspirin (Aspirin Chewable)  81 mg PO DAILY [facility]


Heparin Sodium (Porcine) (Heparin)  5,000 units SC Q8 [facility]


   Last Admin: 03/07/18 05:51 Dose:  5,000 units


Losartan Potassium (Cozaar)  100 mg PO DAILY [facility]


Oxycodone/Acetaminophen (Percocet 5/325 Mg Tab)  1 tab PO Q4H PRN


   PRN Reason: Pain


   Stop: 03/09/18 21:15


Pantoprazole Sodium (Protonix Ec Tab)  40 mg PO DAILY [facility]


Rosuvastatin Calcium (Crestor)  10 mg PO HS [facility]


   Last Admin: 03/06/18 21:21 Dose:  10 mg


Fluticasone/Salmeterol (Advair Diskus 250/50)  1 puff IH RBID [facility]


   Last Admin: 03/07/18 08:19 Dose:  1 inhaler


Tiotropium Bromide (Spiriva)  18 mcg INH RQ24 [facility]


   Last Admin: 03/07/18 08:19 Dose:  18 mcg











- Labs


Labs: 


 





 03/07/18 07:17 





 03/07/18 07:17 





 











PT  12.9 SECONDS (9.7-12.2)  H  03/07/18  07:17    


 


INR  1.2   03/07/18  07:17    


 


APTT  58 SECONDS (21-34)  H D 03/07/18  07:17    














- Additional Findings


Additional findings: 





- Constitutional


Appears: Non-toxic, No Acute Distress





- Head Exam


Head Exam: ATRAUMATIC





- ENT Exam


ENT Exam: Mucous Membranes Moist





- Respiratory Exam


Respiratory Exam: Clear to Ausculation Bilateral.  absent: Accessory Muscle Use

, Rhonchi, Wheezes, Respiratory Distress





- Cardiovascular Exam


Cardiovascular Exam: REGULAR RHYTHM, +S1, +S2





- GI/Abdominal Exam


GI & Abdominal Exam: Soft, Normal Bowel Sounds.  absent: Distended, Firm, 

Guarding, Rigid, Tenderness, Organomegaly


- Note: mid epigastric discomfort / burning has resolved





- Extremities Exam


Extremities Exam: absent: Calf Tenderness, Pedal Edema, Tenderness


-Note: ACE wrap in place at right knee





- Neurological Exam


Neurological Exam: Alert, Awake, Oriented x3





- Psychiatric Exam


Psychiatric exam: Normal Affect, Normal Mood





- Skin


Skin Exam: Dry, Intact, Normal Color, Warm





Assessment and Plan





- Assessment and Plan (Free Text)


Assessment: 





Unstable angina


3/7: Troponins (+) x3. CKMB negative x2. EKG's grossly negative / unchanged x2.


   Scheduled for cardiac cath with Dr. Carrington today.


-EKG 3/6/18 - Normal sinus rhythm, Possible Left atrial enlargement, Inferior 

infarct, age undetermined, T wave abnormality, consider anterior ischemia, 

Abnormal ECG


-CXR - negative


-Troponin #1 (+)


-Cardiology consult, Dr. Carrington, f/u recs


-BNP 2380


-f/u Echo


-Prior Stress test 12/2017 - normal SPECT myocardial perfusion study, fixed 

anteroseptal / apical defects likely 2/2 breast -attenuation; normal gated wall 

motion of L ventricle.


-f/u BERNABE's x2


-f/u EKG x2


-oxygen via NC 2L


-Tylenol 650mg PO Q6H PRN pain


-ASA 81mg


-Crestor 10mg PO HS





GERD


3/7: epigastric burning resolved today.


- Protonix 40mg PO qD


- f/u stool H. Pylori - patient reports she completed her prior tx of 

antibiotics + omeprazole.





HTN


- Continue home medications: amlodipine and cozaar


- will continue to monitor 


-A1c 5.4, TSH pending, Free T4 1.2, Lipids WNL (HDL 42L).





COPD


- Continue Advair and spiriva





Prophylaxis


- protonix 40mg PO qD


- Heparin 5000u SC Q8H


- SCDs








Case discussed with attending. All medical management as per Dr. SAVITA Tipton.

## 2018-03-07 NOTE — HP
HISTORY OF PRESENT ILLNESS:  The patient is a 72-year-old female who has

been complaining of heartburn.  The patient came to the hospital ER,

advised admission.  The patient had history of knee replacement.  The

patient does not smoke or drink.  The patient had workup done in [facility] in December for further cardiac issues and they were all were

negative.



PHYSICAL EXAMINATION:

GENERAL:  The patient is awake, alert, and oriented.

VITAL SIGNS:  Temperature is 98, pulse is 90.

HEENT:  Within normal limits.

NECK:  Supple.

CHEST:  Symmetrical.

HEART:  Regular.

ABDOMEN:  Soft.

EXTREMITIES:  No edema.



IMPRESSION AND PLAN:  The patient to rule out acute coronary syndrome.  The

patient in bedrest and supportive care.





__________________________________________

Mariajose Tipton MD



DD:  03/06/2018 12:32:35

DT:  03/06/2018 13:32:07

Job # [id number]

## 2018-03-07 NOTE — CARDCATH
PROCEDURE DATE:  03/07/2018



PROCEDURES:

1.  Left heart catheterization.

2.  Coronary angiogram.

3.  Radiological supervision and radiological interpretation of the cardiac

catheterization.



CLINICAL INDICATIONS:

1.  Chest pain.

2.  Non-ST elevation myocardial infarction.

3.  Hyperlipemia.

4.  Coronary artery disease.



REFERRING PHYSICIAN:  Mariajose Tipton M.D.



PERFORMING PHYSICIAN:  Randall Carrington M.D.



DESCRIPTION OF PROCEDURE:   After informed consent, the patient was prepped

and draped in the usual sterile fashion.  A 2% lidocaine was given in the

right wrist for local anesthesia.  Using micropuncture technique, 6-Danish

sheath was introduced into right radial artery.



A JR4 6-Danish diagnostic catheter crossed into left ventricle.  Contrast

injected and LV angiogram was performed.  LVEDP was measured.  The catheter

was pulled back across the aortic valve.  Gradient across the aortic valve

was measured.  Then JR4 catheter engaged into right coronary artery. 

Contrast injected and right coronary angiogram was done.  A 6-Danish Tiger

catheter engaged into left main coronary artery.  Contrast injected and

left coronary angiogram was performed.



The patient tolerated the procedure well.  Postprocedure, Terumo radial

band applied to right wrist with excellent hemostasis.



FINDINGS:

1.  Left main coronary artery is patent.

2.  Proximal LAD has mild luminal irregularities.  Mid LAD has 70%

concentric stenosis.  Distal LAD and diagonal branches are patent.

3.  Left circumflex is large.  Mid left circumflex has a 95% concentric

stenosis.  Obtuse marginal branches are patent.

4.  Right coronary artery has 100% occlusion in the mid region.  This is a

chronic total occlusion.  There are collaterals from the left and right

system.

5.  LV ejection fraction is approximately 70%.  No wall motion

abnormalities noted.  EDP is 15.  No gradient across the aortic valve.









IMPRESSION:

1.  Triple vessel disease.

2.  Hypertension.

3.  Hyperlipidemia.



PLAN:  Recommend either coronary artery bypass surgery or multivessel

stenting.





__________________________________________

Randall Carrington MD





DD:  03/07/2018 13:15:21

DT:  03/07/2018 19:39:18

Job # [id number]

## 2018-03-08 ENCOUNTER — HOSPITAL ENCOUNTER (OUTPATIENT)
Dept: HOSPITAL 42 - CATH | Age: 73
Discharge: ANOTHER HOSPITAL | End: 2018-03-08
Payer: COMMERCIAL

## 2018-03-08 VITALS — DIASTOLIC BLOOD PRESSURE: 86 MMHG | SYSTOLIC BLOOD PRESSURE: 149 MMHG | HEART RATE: 69 BPM

## 2018-03-08 VITALS — BODY MASS INDEX: 27.3 KG/M2

## 2018-03-08 VITALS — RESPIRATION RATE: 20 BRPM

## 2018-03-08 VITALS — RESPIRATION RATE: 19 BRPM

## 2018-03-08 VITALS — TEMPERATURE: 98.4 F

## 2018-03-08 DIAGNOSIS — I10: ICD-10-CM

## 2018-03-08 DIAGNOSIS — E78.5: ICD-10-CM

## 2018-03-08 DIAGNOSIS — I25.10: ICD-10-CM

## 2018-03-08 DIAGNOSIS — I21.4: Primary | ICD-10-CM

## 2018-03-08 LAB
ALBUMIN SERPL-MCNC: 3 G/DL (ref 3.5–5)
ALBUMIN/GLOB SERPL: 0.9 {RATIO} (ref 1–2.1)
ALT SERPL-CCNC: 36 U/L (ref 9–52)
AST SERPL-CCNC: 22 U/L (ref 14–36)
BASOPHILS # BLD AUTO: 0.1 K/UL (ref 0–0.2)
BASOPHILS NFR BLD: 1.3 % (ref 0–2)
BUN SERPL-MCNC: 19 MG/DL (ref 7–17)
CALCIUM SERPL-MCNC: 8.8 MG/DL (ref 8.6–10.4)
EOSINOPHIL # BLD AUTO: 0.5 K/UL (ref 0–0.7)
EOSINOPHIL NFR BLD: 11.2 % (ref 0–4)
ERYTHROCYTE [DISTWIDTH] IN BLOOD BY AUTOMATED COUNT: 15.5 % (ref 11.5–14.5)
GFR NON-AFRICAN AMERICAN: 55
HGB BLD-MCNC: 10.5 G/DL (ref 11–16)
LYMPHOCYTES # BLD AUTO: 1.7 K/UL (ref 1–4.3)
LYMPHOCYTES NFR BLD AUTO: 40 % (ref 20–40)
MCH RBC QN AUTO: 27.8 PG (ref 27–31)
MCHC RBC AUTO-ENTMCNC: 32.8 G/DL (ref 33–37)
MCV RBC AUTO: 84.8 FL (ref 81–99)
MONOCYTES # BLD: 0.5 K/UL (ref 0–0.8)
MONOCYTES NFR BLD: 11.3 % (ref 0–10)
NEUTROPHILS # BLD: 1.6 K/UL (ref 1.8–7)
NEUTROPHILS NFR BLD AUTO: 36.2 % (ref 50–75)
NRBC BLD AUTO-RTO: 0.1 % (ref 0–2)
PLATELET # BLD: 173 K/UL (ref 130–400)
PMV BLD AUTO: 9.8 FL (ref 7.2–11.7)
RBC # BLD AUTO: 3.79 MIL/UL (ref 3.8–5.2)
WBC # BLD AUTO: 4.4 K/UL (ref 4.8–10.8)

## 2018-03-08 PROCEDURE — 93005 ELECTROCARDIOGRAM TRACING: CPT

## 2018-03-08 PROCEDURE — 99152 MOD SED SAME PHYS/QHP 5/>YRS: CPT

## 2018-03-08 PROCEDURE — 85175 BLOOD CLOT LYSIS TIME: CPT

## 2018-03-08 RX ADMIN — FLUTICASONE PROPIONATE AND SALMETEROL SCH: 50; 250 POWDER RESPIRATORY (INHALATION) at 10:00

## 2018-03-08 RX ADMIN — FLUTICASONE PROPIONATE AND SALMETEROL SCH INHALER: 50; 250 POWDER RESPIRATORY (INHALATION) at 20:52

## 2018-03-08 RX ADMIN — TIOTROPIUM BROMIDE SCH: 18 CAPSULE ORAL; RESPIRATORY (INHALATION) at 10:01

## 2018-03-08 RX ADMIN — PANTOPRAZOLE SODIUM SCH: 40 TABLET, DELAYED RELEASE ORAL at 11:00

## 2018-03-08 NOTE — CARD
--------------- APPROVED REPORT --------------





EXAM: Two-dimensional and M-mode echocardiogram with Doppler and 

color Doppler.



Other Information 

Quality : GoodRhythm : 



INDICATION

UNSTABLE ANGINA,POSITIVE TROPONIN



RISK FACTORS

Hypertension 



2D DIMENSIONS 

IVSd1.3   (0.7-1.1cm)LVDd3.8   (3.9-5.9cm)

PWd1.4   (0.7-1.1cm)LVDs2.6   (2.5-4.0cm)

FS (%) 31.6   %LVEF (%)60.3   (>50%)



M-Mode DIMENSIONS 

Left Atrium (MM)2.95   (2.5-4.0cm)Aortic Root3.61   (2.2-3.7cm)

Aortic Cusp Exc.2.31   (1.5-2.0cm)



Mitral Valve

MV E Wttvmilt51.0cm/sMV A Oqripzpv797.7cm/sE/A ratio0.3



TDI

E/Lateral E'0.0E/Medial E'0.0



Tricuspid Valve

TR Peak Jtdmbbnl554ym/sTR Peak Gr.73amUtHDKR53ehAo



 LEFT VENTRICLE 

The left ventricle is normal size.

There is mild concentric left ventricular hypertrophy.

Left ventricle systolic function is normal.

The Ejection Fraction is 60-65%.

There is normal LV segmental wall motion.

Tissue Doppler imaging reveals abnormal left ventricular diastolic 

dysfunction.



 RIGHT VENTRICLE 

The right ventricle is normal size.

There is normal right ventricular wall thickness.

The right ventricular systolic function is normal.



 ATRIA 

The left atrium size is normal.

The right atrium size is normal.

The interatrial septum is intact with no evidence for an atrial 

septal defect.



 AORTIC VALVE 

The aortic valve is normal in structure.

No aortic regurgitation is present.

There is no aortic valvular stenosis. 

There is no aortic valvular vegetation.



 MITRAL VALVE 

The mitral valve is normal in structure.

There is no evidence of mitral valve prolapse.

There is no mitral valve stenosis.

There is no mitral valve regurgitation noted.



 TRICUSPID VALVE 

The tricuspid valve is normal in structure.

No tricuspid regurgitation.

Right ventricular systolic pressure is estimated at less than 30 

mmHg. 

There is no pulmonary hypertension.



 PULMONIC VALVE 

The pulmonic valve is not well visualized.

There is no pulmonic valvular regurgitation. 



 GREAT VESSELS 

The aortic root is normal in size.



 PERICARDIAL EFFUSION 

There is no significant  pericardial effusion.



<Conclusion>

Left ventricle systolic function is normal.

The Ejection Fraction is 60-65%.

Hypertensive heart disease.

Diastolic dysfunction.

No aortic regurgitation is present.

There is no mitral valve regurgitation noted.

No tricuspid regurgitation.

There is no pulmonary hypertension.

There is no pulmonic valvular regurgitation.

## 2018-03-08 NOTE — CP.PCM.PN
Subjective





- Date & Time of Evaluation


Date of Evaluation: 03/08/18


Time of Evaluation: 12:27





- Subjective


Subjective: 





Patient s/p L Cx HARI stent


Uneventful








OOB to chair or ambulate after 5pm today if okay from ortho


Hydration


Labs


Resume diet





ASA 81, B blockers and Statin daily for life if no contra indication


Plavix 75 daily for 1 year


Staged PCI of RCA and LAD after 4 weeks





F/U with my office in 1-2 weeks


Will reassess her in am for potential discharge





Objective





- Vital Signs/Intake and Output


Vital Signs (last 24 hours): 


 











Temp Pulse Resp BP Pulse Ox


 


 98 F   77   18   169/88 H  98 


 


 03/08/18 09:17  03/08/18 09:17  03/08/18 09:17  03/08/18 09:22  03/08/18 09:17











- Medications


Medications: 


 Current Medications





Acetaminophen (Tylenol 325mg Tab)  650 mg PO Q6 PRN


   PRN Reason: Pain, Mild (1-3)


   Last Admin: 03/07/18 20:09 Dose:  650 mg


Amlodipine Besylate (Norvasc)  10 mg PO DAILY [facility]


   Last Admin: 03/08/18 09:22 Dose:  10 mg


Aspirin (Aspirin Chewable)  81 mg PO DAILY [facility]


   Last Admin: 03/08/18 10:00 Dose:  Not Given


Heparin Sodium (Porcine) (Heparin)  5,000 units SC Q8 [facility]


   Last Admin: 03/08/18 05:18 Dose:  5,000 units


Losartan Potassium (Cozaar)  100 mg PO DAILY [facility]


   Last Admin: 03/08/18 09:22 Dose:  100 mg


Metoprolol Tartrate (Lopressor)  25 mg PO BID [facility]


   Last Admin: 03/08/18 09:22 Dose:  25 mg


Oxycodone/Acetaminophen (Percocet 5/325 Mg Tab)  1 tab PO Q4H PRN


   PRN Reason: Pain


   Stop: 03/09/18 21:15


Pantoprazole Sodium (Protonix Ec Tab)  40 mg PO DAILY [facility]


   Last Admin: 03/08/18 11:00 Dose:  Not Given


Rosuvastatin Calcium (Crestor)  10 mg PO HS [facility]


   Last Admin: 03/07/18 21:08 Dose:  10 mg


Fluticasone/Salmeterol (Advair Diskus 250/50)  1 puff IH RBID [facility]


   Last Admin: 03/08/18 10:00 Dose:  Not Given


Tiotropium Bromide (Spiriva)  18 mcg INH RQ24 [facility]


   Last Admin: 03/08/18 10:01 Dose:  Not Given











- Labs


Labs: 


 





 03/08/18 06:58 





 03/08/18 06:58 





 











PT  12.9 SECONDS (9.7-12.2)  H  03/07/18  07:17    


 


INR  1.2   03/07/18  07:17    


 


APTT  58 SECONDS (21-34)  H D 03/07/18  07:17

## 2018-03-08 NOTE — CARD
--------------- APPROVED REPORT --------------





EKG Measurement

Heart [id number]

ME 170P56

[id number]

[id number]

[id number]



<Conclusion>

Normal sinus rhythm

Possible Left atrial enlargement

Inferior infarct, age undetermined

T wave abnormality, consider anterior ischemia

Abnormal ECG

## 2018-03-08 NOTE — CP.PCM.PN
Subjective





- Date & Time of Evaluation


Date of Evaluation: 03/08/18


Time of Evaluation: 10:47





- Subjective


Subjective: 


PGY2 Medicine note for Dr. Tipton; all management as per Dr. Tipton 





This patient was seen and examined at bedside this AM; denies any acute 

complaints or overnight events; states she is not nervous at all for her 

procedure at [place]; denies current fevers/chills, HA, CP, SOB, abdominal pain

, N/V/D dysuria/freq/urg or lower extremity pain/swelling. 





Objective





- Vital Signs/Intake and Output


Vital Signs (last 24 hours): 


 











Temp Pulse Resp BP Pulse Ox


 


 98 F   77   20   169/88 H  98 


 


 03/08/18 08:00  03/08/18 09:17  03/08/18 08:00  03/08/18 09:22  03/08/18 08:00











- Medications


Medications: 


 Current Medications





Acetaminophen (Tylenol 325mg Tab)  650 mg PO Q6 PRN


   PRN Reason: Pain, Mild (1-3)


   Last Admin: 03/07/18 20:09 Dose:  650 mg


Amlodipine Besylate (Norvasc)  10 mg PO DAILY [facility]


   Last Admin: 03/08/18 09:22 Dose:  10 mg


Aspirin (Aspirin Chewable)  81 mg PO DAILY [facility]


   Last Admin: 03/07/18 09:10 Dose:  81 mg


Heparin Sodium (Porcine) (Heparin)  5,000 units SC Q8 [facility]


   Last Admin: 03/08/18 05:18 Dose:  5,000 units


Losartan Potassium (Cozaar)  100 mg PO DAILY [facility]


   Last Admin: 03/08/18 09:22 Dose:  100 mg


Metoprolol Tartrate (Lopressor)  25 mg PO BID [facility]


   Last Admin: 03/08/18 09:22 Dose:  25 mg


Oxycodone/Acetaminophen (Percocet 5/325 Mg Tab)  1 tab PO Q4H PRN


   PRN Reason: Pain


   Stop: 03/09/18 21:15


Pantoprazole Sodium (Protonix Ec Tab)  40 mg PO DAILY [facility]


   Last Admin: 03/07/18 09:09 Dose:  40 mg


Rosuvastatin Calcium (Crestor)  10 mg PO HS [facility]


   Last Admin: 03/07/18 21:08 Dose:  10 mg


Fluticasone/Salmeterol (Advair Diskus 250/50)  1 puff IH RBID [facility]


   Last Admin: 03/08/18 10:00 Dose:  Not Given


Tiotropium Bromide (Spiriva)  18 mcg INH RQ24 [facility]


   Last Admin: 03/08/18 10:01 Dose:  Not Given











- Labs


Labs: 


 





 03/08/18 06:58 





 03/08/18 06:58 





 











PT  12.9 SECONDS (9.7-12.2)  H  03/07/18  07:17    


 


INR  1.2   03/07/18  07:17    


 


APTT  58 SECONDS (21-34)  H D 03/07/18  07:17    














- Constitutional


Appears: Non-toxic





- Head Exam


Head Exam: ATRAUMATIC





- Eye Exam


Eye Exam: EOMI


Pupil Exam: PERRL





- ENT Exam


ENT Exam: Mucous Membranes Moist





- Neck Exam


Neck Exam: Full ROM





- Respiratory Exam


Respiratory Exam: Clear to Ausculation Bilateral, NORMAL BREATHING PATTERN.  

absent: Rales, Rhonchi, Wheezes





- Cardiovascular Exam


Cardiovascular Exam: REGULAR RHYTHM, +S1, +S2





- GI/Abdominal Exam


GI & Abdominal Exam: Soft, Normal Bowel Sounds





- Extremities Exam


Extremities Exam: absent: Calf Tenderness





- Neurological Exam


Neurological Exam: Alert, Awake, Oriented x3





- Psychiatric Exam


Psychiatric exam: Normal Affect





Assessment and Plan





- Assessment and Plan (Free Text)


Assessment: 


71yo F admitted for unstable angina








Unstable angina


3/8: patient is being transported to [place] for cath and stenting; will be 

transported back after procedure is completed; results to [facility] 


3/7: Troponins (+) x3. CKMB negative x2. EKG's grossly negative / unchanged x2.


   Scheduled for cardiac cath with Dr. Carrington today.


-EKG 3/6/18 - Normal sinus rhythm, Possible Left atrial enlargement, Inferior 

infarct, age undetermined, T wave abnormality, consider anterior ischemia, 

Abnormal ECG


-CXR - negative


-Troponin #1 (+)


-Cardiology consult, Dr. Carrington, f/u recs


-BNP 2380


-f/u Echo


-Prior Stress test 12/2017 - normal SPECT myocardial perfusion study, fixed 

anteroseptal / apical defects likely 2/2 breast -attenuation; normal gated wall 

motion of L ventricle.


-f/u BERNABE's x2


-f/u EKG x2


-oxygen via NC 2L


-Tylenol 650mg PO Q6H PRN pain


-ASA 81mg


-Crestor 10mg PO HS





GERD; chronic 


3/7: epigastric burning resolved today.


- Protonix 40mg PO qD


- f/u stool H. Pylori - patient reports she completed her prior tx of 

antibiotics + omeprazole.





HTN;chronic 


- Continue home medications: amlodipine and cozaar


- will continue to monitor 


-A1c 5.4, TSH pending, Free T4 1.2, Lipids WNL (HDL 42L).





COPD;chronic not in acute exacerbation


- Continue Advair and spiriva





Prophylaxis


- protonix 40mg PO qD


- Heparin 5000u SC Q8H


- SCDs








Case discussed with attending. All medical management as per Dr. SAVITA Tipton.

## 2018-03-08 NOTE — CARD
--------------- APPROVED REPORT --------------





EKG Measurement

Heart [id number]

NH 172P67

[id number]

[id number]

[id number]



<Conclusion>

Normal sinus rhythm

Low voltage QRS

Possible Inferior infarct, age undetermined

Abnormal ECG

## 2018-03-08 NOTE — CARD
--------------- APPROVED REPORT --------------





EKG Measurement

Heart [id number]

IN 176P51

[id number]

[id number]

[id number]



<Conclusion>

Normal sinus rhythm

Possible Left atrial enlargement

Inferior infarct, age undetermined

Abnormal ECG

## 2018-03-09 VITALS
SYSTOLIC BLOOD PRESSURE: 162 MMHG | HEART RATE: 85 BPM | DIASTOLIC BLOOD PRESSURE: 92 MMHG | TEMPERATURE: 98.2 F | OXYGEN SATURATION: 100 %

## 2018-03-09 LAB
ALBUMIN SERPL-MCNC: 3.3 G/DL (ref 3.5–5)
ALBUMIN/GLOB SERPL: 1.1 {RATIO} (ref 1–2.1)
ALT SERPL-CCNC: 35 U/L (ref 9–52)
AST SERPL-CCNC: 27 U/L (ref 14–36)
BASOPHILS # BLD AUTO: 0.1 K/UL (ref 0–0.2)
BASOPHILS NFR BLD: 1 % (ref 0–2)
BUN SERPL-MCNC: 18 MG/DL (ref 7–17)
CALCIUM SERPL-MCNC: 9.3 MG/DL (ref 8.6–10.4)
EOSINOPHIL # BLD AUTO: 0.4 K/UL (ref 0–0.7)
EOSINOPHIL NFR BLD: 8.5 % (ref 0–4)
ERYTHROCYTE [DISTWIDTH] IN BLOOD BY AUTOMATED COUNT: 16.1 % (ref 11.5–14.5)
GFR NON-AFRICAN AMERICAN: > 60
HGB BLD-MCNC: 10.7 G/DL (ref 11–16)
LYMPHOCYTES # BLD AUTO: 1.8 K/UL (ref 1–4.3)
LYMPHOCYTES NFR BLD AUTO: 35.1 % (ref 20–40)
MCH RBC QN AUTO: 27.7 PG (ref 27–31)
MCHC RBC AUTO-ENTMCNC: 32.7 G/DL (ref 33–37)
MCV RBC AUTO: 84.8 FL (ref 81–99)
MONOCYTES # BLD: 0.5 K/UL (ref 0–0.8)
MONOCYTES NFR BLD: 9.6 % (ref 0–10)
NEUTROPHILS # BLD: 2.3 K/UL (ref 1.8–7)
NEUTROPHILS NFR BLD AUTO: 45.8 % (ref 50–75)
NRBC BLD AUTO-RTO: 0.1 % (ref 0–2)
PLATELET # BLD: 173 K/UL (ref 130–400)
PMV BLD AUTO: 9.8 FL (ref 7.2–11.7)
RBC # BLD AUTO: 3.85 MIL/UL (ref 3.8–5.2)
WBC # BLD AUTO: 5.1 K/UL (ref 4.8–10.8)

## 2018-03-09 RX ADMIN — TIOTROPIUM BROMIDE SCH MCG: 18 CAPSULE ORAL; RESPIRATORY (INHALATION) at 09:27

## 2018-03-09 RX ADMIN — FLUTICASONE PROPIONATE AND SALMETEROL SCH INHALER: 50; 250 POWDER RESPIRATORY (INHALATION) at 09:27

## 2018-03-09 RX ADMIN — PANTOPRAZOLE SODIUM SCH MG: 40 TABLET, DELAYED RELEASE ORAL at 10:39

## 2018-03-09 NOTE — CP.PCM.PN
Subjective





- Date & Time of Evaluation


Date of Evaluation: 03/09/18


Time of Evaluation: 09:12





- Subjective


Subjective: 


PGY2 Medicine Note for Dr. Tipton, all management as per Dr. Tipton 





The patient was seen and examined at bedside this AM; she had no overnight 

events as well as no acute events.





Objective





- Vital Signs/Intake and Output


Vital Signs (last 24 hours): 


 











Temp Pulse Resp BP Pulse Ox


 


 98.2 F   85   20   162/92 H  100 


 


 03/09/18 07:50  03/09/18 07:50  03/09/18 07:50  03/09/18 07:50  03/09/18 07:50








Intake and Output: 


 











 03/09/18 03/09/18





 06:59 18:59


 


Intake Total 560 


 


Output Total 200 


 


Balance 360 














- Medications


Medications: 


 Current Medications





Acetaminophen (Tylenol 325mg Tab)  650 mg PO Q6 PRN


   PRN Reason: Pain, Mild (1-3)


   Last Admin: 03/07/18 20:09 Dose:  650 mg


Amlodipine Besylate (Norvasc)  10 mg PO DAILY [facility]


   Last Admin: 03/08/18 09:22 Dose:  10 mg


Aspirin (Aspirin Chewable)  81 mg PO DAILY [facility]


   Last Admin: 03/08/18 10:00 Dose:  Not Given


Clopidogrel Bisulfate (Plavix)  75 mg PO DAILY [facility]


Heparin Sodium (Porcine) (Heparin)  5,000 units SC Q8 [facility]


   Last Admin: 03/09/18 05:39 Dose:  5,000 units


Sodium Chloride (Sodium Chloride 0.9%)  1,000 mls @ 70 mls/hr IV .[id number] [facility]


   Last Admin: 03/08/18 22:06 Dose:  Not Given


Losartan Potassium (Cozaar)  100 mg PO DAILY [facility]


   Last Admin: 03/08/18 09:22 Dose:  100 mg


Metoprolol Tartrate (Lopressor)  25 mg PO BID [facility]


   Last Admin: 03/08/18 18:00 Dose:  Not Given


Oxycodone/Acetaminophen (Percocet 5/325 Mg Tab)  1 tab PO Q4H PRN


   PRN Reason: Pain


   Stop: 03/09/18 21:15


Pantoprazole Sodium (Protonix Ec Tab)  40 mg PO DAILY [facility]


   Last Admin: 03/08/18 11:00 Dose:  Not Given


Rosuvastatin Calcium (Crestor)  10 mg PO HS [facility]


   Last Admin: 03/08/18 22:02 Dose:  10 mg


Fluticasone/Salmeterol (Advair Diskus 250/50)  1 puff IH RBID [facility]


   Last Admin: 03/08/18 20:52 Dose:  1 inhaler


Tiotropium Bromide (Spiriva)  18 mcg INH RQ24 [facility]


   Last Admin: 03/08/18 10:01 Dose:  Not Given











- Labs


Labs: 


 





 03/09/18 07:35 





 03/09/18 07:35 





 











PT  12.9 SECONDS (9.7-12.2)  H  03/07/18  07:17    


 


INR  1.2   03/07/18  07:17    


 


APTT  58 SECONDS (21-34)  H D 03/07/18  07:17    














- Head Exam


Additional comments: 


Appears: Non-toxic





- Head Exam


Head Exam: ATRAUMATIC





- Eye Exam


Eye Exam: EOMI


Pupil Exam: PERRL





- ENT Exam


ENT Exam: Mucous Membranes Moist





- Neck Exam


Neck Exam: Full ROM





- Respiratory Exam


Respiratory Exam: Clear to Ausculation Bilateral, NORMAL BREATHING PATTERN.  

absent: Rales, Rhonchi, Wheezes





- Cardiovascular Exam


Cardiovascular Exam: REGULAR RHYTHM, +S1, +S2





- GI/Abdominal Exam


GI & Abdominal Exam: Soft, Normal Bowel Sounds





- Extremities Exam


Extremities Exam: absent: Calf Tenderness





- Neurological Exam


Neurological Exam: Alert, Awake, Oriented x3





- Psychiatric Exam


Psychiatric exam: Normal Affect





Assessment and Plan





- Assessment and Plan (Free Text)


Assessment: 


73yo F admitted for unstable angina








Unstable angina


3/8: patient is being transported to [place] for cath and stenting; will be 

transported back after procedure is completed; results to [facility] 


3/7: Troponins (+) x3. CKMB negative x2. EKG's grossly negative / unchanged x2.


   Scheduled for cardiac cath with Dr. Carrington today.


-EKG 3/6/18 - Normal sinus rhythm, Possible Left atrial enlargement, Inferior 

infarct, age undetermined, T wave abnormality, consider anterior ischemia, 

Abnormal ECG


-CXR - negative


-Troponin #1 (+)


-Cardiology consult, Dr. Carrington, f/u recs


-BNP 2380


-Echo WNL


-Prior Stress test 12/2017 - normal SPECT myocardial perfusion study, fixed 

anteroseptal / apical defects likely 2/2 breast -attenuation; normal gated wall 

motion of L ventricle.


-f/u BERNABE's x2


-f/u EKG x2


-oxygen via NC 2L


-Tylenol 650mg PO Q6H PRN pain


-ASA 81mg


-Crestor 10mg PO HS





GERD; chronic 


3/7: epigastric burning resolved today.


- Protonix 40mg PO qD


- f/u stool H. Pylori - patient reports she completed her prior tx of 

antibiotics + omeprazole.





HTN;chronic 


- Continue home medications: amlodipine and cozaar


- will continue to monitor 


-A1c 5.4, TSH pending, Free T4 1.2, Lipids WNL (HDL 42L).





COPD;chronic not in acute exacerbation


- Continue Advair and spiriva





The patient is stable for d/c as per Dr. Tipton 


The patient will continue the following medications: 


Amlodipine 10mg daily 


Aspirin 81mg daily 


Plavix 75mg for one year


Losartan 100mg daily


Crestor 20mg at night 


Toprol XL 50mg


c/w Advair and spiriva


Given script for rolling walker, bedside commode, and shower chair as well as 

skilled nursing and PT in chart


Echo shows diastolic heart failure; will continue with above management as EF 

is preserved


Patient is to f/u with Dr. Carrington in 1 week; will have further stenting as per 

Dr. Carrington


patient has no signs of systolic heart failure 





Congested Heart Failure - CM





- Type of Heart Failure:


Diastolic dysfunction:: Chronic





- LVF prior to this hospilization,if known


LVF prior to this hospilization: Election Fraction greater than 40%


Definitive Plan: will have more PCI as per michael; c/w with medical management 

for diastolic dysfunction





- Ejection Fraction ______%


Fraction %: 55





- ACE or ARB


ACE or ARB: Name/dose/frequency: Losartan 100mg daily 





- Beta Blocker


Beta Blocker prescribed: Name/dose/frequency: Toprol XL 50mg 





- Digoxin


Contraindication: Not indicated





- Diuretic


Diuretic Therapy (lasix, thiazides, aldactone) (rene for EF less than 30%): not 

indicated 





- Nitrate Therapy


Nitrate Therapy:: not indicated 





- Hydralazine


Hydralazine:: not indicated 





- Smoking Cessation


Smoking Cessation Counseling: Nonsmoker: Has not smoked within 12 months





- Recommendation


Recommendation for ICD referral or CTR: No





- Followup Appointment


Follow Up appointment: 03/16/18


Follow Up with: Randall Carrington

## 2018-03-09 NOTE — CARD
--------------- APPROVED REPORT --------------





EKG Measurement

Heart [id number]

[place] 174P52

[id number]

[id number]

[id number]



<Conclusion>

Normal sinus rhythm

Possible Left atrial enlargement

Inferior infarct, age undetermined

T wave abnormality, consider anterior ischemia

Abnormal ECG

## 2018-03-13 NOTE — CARDCATH
PROCEDURE DATE:  03/08/2018



PROCEDURES:  Percutaneous coronary intervention and drug-eluting stent

placement of the left circumflex coronary artery.



CLINICAL INDICATIONS:

1.  Chest pain.

2.  Non-ST elevation myocardial infarction.

3.  Coronary artery disease.

4.  Hypertension.

5.  Hyperlipidemia.



PERFORMING PHYSICIAN:  Nicola Lopez MD



PROCEDURE:  After informed consent, the patient was prepped and draped in

the usual sterile fashion.  2% lidocaine was given in the right groin for

local anesthesia.  Using micropuncture technique, 6-Georgian sheath was

introduced into right common femoral artery.



The patient was preloaded with aspirin, Plavix, and IV heparin.  ACT was

maintained above 250.



A 6-Georgian XB 3.5 guided catheter was engaged into left main coronary

artery.  Contrast injected and left coronary angiogram was performed.  Left

main coronary artery was patent, but mid left circumflex coronary artery

has 95% concentric stenosis.  Obtuse marginal branches were patent. 

Proximal LAD was patent, however, mid to distal LAD has 70% to 75% calcific

narrowing.  Distal LAD was patent.



Left circumflex was threaded with Runthrough coronary wire.  Mid left

circumflex lesion was predilated using 2.25 x 15 noncompliant balloon.

Later stented with 2.5 x 23 Xience Alpine drug-eluting stent.  Excellent

final angiographic results with brisk FILIBERTO-3 flow noted.



IMPRESSION:

1.  Coronary artery disease as described above.

2.  Successful coronary intervention of the left circumflex.



PLAN:  Patient will be scheduled for staged intervention of the LAD and RCA

as an outpatient.





__________________________________________

Nicola Lopez MD



DD:  03/12/2018 23:59:17

DT:  03/13/2018 2:00:05

Job # [id number]

## 2019-04-04 ENCOUNTER — HOSPITAL ENCOUNTER (INPATIENT)
Dept: HOSPITAL 31 - C.ER | Age: 74
LOS: 7 days | Discharge: HOME | DRG: 305 | End: 2019-04-11
Attending: INTERNAL MEDICINE | Admitting: INTERNAL MEDICINE
Payer: MEDICARE

## 2019-04-04 VITALS — BODY MASS INDEX: 27.3 KG/M2

## 2019-04-04 DIAGNOSIS — Z96.651: ICD-10-CM

## 2019-04-04 DIAGNOSIS — I16.0: Primary | ICD-10-CM

## 2019-04-04 DIAGNOSIS — I25.10: ICD-10-CM

## 2019-04-04 DIAGNOSIS — K21.9: ICD-10-CM

## 2019-04-04 DIAGNOSIS — E78.00: ICD-10-CM

## 2019-04-04 DIAGNOSIS — Z83.3: ICD-10-CM

## 2019-04-04 DIAGNOSIS — Z95.5: ICD-10-CM

## 2019-04-04 DIAGNOSIS — E78.5: ICD-10-CM

## 2019-04-04 DIAGNOSIS — M25.561: ICD-10-CM

## 2019-04-04 DIAGNOSIS — N17.9: ICD-10-CM

## 2019-04-04 DIAGNOSIS — M19.90: ICD-10-CM

## 2019-04-04 DIAGNOSIS — J20.9: ICD-10-CM

## 2019-04-04 DIAGNOSIS — E55.9: ICD-10-CM

## 2019-04-04 DIAGNOSIS — H57.89: ICD-10-CM

## 2019-04-04 DIAGNOSIS — I10: ICD-10-CM

## 2019-04-04 DIAGNOSIS — J44.0: ICD-10-CM

## 2019-04-04 LAB
ALBUMIN SERPL-MCNC: 4.1 G/DL (ref 3.5–5)
ALBUMIN/GLOB SERPL: 1.3 {RATIO} (ref 1–2.1)
ALT SERPL-CCNC: 8 U/L (ref 9–52)
AST SERPL-CCNC: 24 U/L (ref 14–36)
BASOPHILS # BLD AUTO: 0 K/UL (ref 0–0.2)
BASOPHILS NFR BLD: 0.6 % (ref 0–2)
BUN SERPL-MCNC: 18 MG/DL (ref 7–17)
CALCIUM SERPL-MCNC: 9.2 MG/DL (ref 8.6–10.4)
EOSINOPHIL # BLD AUTO: 0.1 K/UL (ref 0–0.7)
EOSINOPHIL NFR BLD: 2 % (ref 0–4)
ERYTHROCYTE [DISTWIDTH] IN BLOOD BY AUTOMATED COUNT: 15.4 % (ref 11.5–14.5)
GFR NON-AFRICAN AMERICAN: > 60
HGB BLD-MCNC: 13.8 G/DL (ref 11–16)
LYMPHOCYTES # BLD AUTO: 1.7 K/UL (ref 1–4.3)
LYMPHOCYTES NFR BLD AUTO: 36.8 % (ref 20–40)
MCH RBC QN AUTO: 27.2 PG (ref 27–31)
MCHC RBC AUTO-ENTMCNC: 31.7 G/DL (ref 33–37)
MCV RBC AUTO: 85.9 FL (ref 81–99)
MONOCYTES # BLD: 0.5 K/UL (ref 0–0.8)
MONOCYTES NFR BLD: 10.4 % (ref 0–10)
NEUTROPHILS # BLD: 2.4 K/UL (ref 1.8–7)
NEUTROPHILS NFR BLD AUTO: 50.2 % (ref 50–75)
NRBC BLD AUTO-RTO: 0.1 % (ref 0–2)
PLATELET # BLD: 178 K/UL (ref 130–400)
PMV BLD AUTO: 10.6 FL (ref 7.2–11.7)
RBC # BLD AUTO: 5.06 MIL/UL (ref 3.8–5.2)
WBC # BLD AUTO: 4.7 K/UL (ref 4.8–10.8)

## 2019-04-04 NOTE — C.PDOC
History Of Present Illness


73 year old female sent from Dr. Carrington's clinic for admission for visual 

disturbances and high blood pressure. Patient states that she  has some eye 

irritation and her vision becomes blurry when she blinks. Patient has a PMHx of 

hypertension, CAD s/p stent placed last year, and total right knee replacement. 

Patient states that she was at 's office for a routine visit. Patient 

states that she tool her hypertension medication this morning. Patient also 

complains of some  right knee pain. Patient denies headache, dizziness, SOB, 

chest pain, and palpitations. 





Time Seen by Provider: 04/04/19 17:10


Chief Complaint (Nursing): High Blood Pressure


History Per: Patient


History/Exam Limitations: no limitations


Current Symptoms Are (Timing): Still Present


Associated Symptoms: Blurred Vision.  denies: Chest Pain, Dizziness, Headache





Past Medical History


Reviewed: Historical Data, Nursing Documentation, Vital Signs


Vital Signs: 





                                Last Vital Signs











Temp  98.6 F   04/04/19 16:53


 


Pulse  62   04/04/19 16:53


 


Resp  20   04/04/19 16:53


 


BP  222/103 H  04/04/19 16:53


 


Pulse Ox  98   04/04/19 16:53














- Medical History


PMH: Arthritis, Bronchitis, CAD (s/p stents placed 1 year ago), COPD, GERD, HTN


   Denies: Chronic Kidney Disease


Other Surgeries: Total knee replacement (right knee)





- [facility] Procedures











 (01/31/18)


CYSTOCELE REPAIR (07/06/98)


CYSTOGRAM NEC (04/27/98)


CYSTOMETROGRAM (04/27/98)


CYSTOSCOPY NEC (07/06/98)


FLUOROSCOPY OF MULT COR ART USING L OSM CONTRAST (03/06/18)


MEASURE OF CARDIAC SAMPL & PRESSURE, L HEART, PERC APPROACH (03/06/18)


OTHER CYSTOSTOMY (07/06/98)


SUPRAPUBIC SLING OP (07/06/98)








Family History: States: Unknown Family Hx





- Social History


Hx Tobacco Use: No


Hx Alcohol Use: No


Hx Substance Use: No





- Immunization History


Hx Tetanus Toxoid Vaccination: Yes


Hx Influenza Vaccination: Yes (1/2018)


Hx Pneumococcal Vaccination: Yes (1/2018)





Review Of Systems


Eyes: Positive for: Vision Change (blurry vision with blinking from eye 

irritation)


Cardiovascular: Negative for: Chest Pain, Palpitations


Respiratory: Negative for: Shortness of Breath


Musculoskeletal: Positive for: Other (right knee pain )


Neurological: Negative for: Headache, Dizziness





Physical Exam





- Physical Exam


Appears: Non-toxic, Other (hypertensive)


Skin: Normal Color, Warm, Dry


Head: Atraumatic, Normacephalic


Eye(s): bilateral: Normal Inspection, PERRL


Neck: Normal ROM, Supple


Chest: Symmetrical, No Deformity


Cardiovascular: Rhythm Regular, No Murmur


Respiratory: No Accessory Muscle Use, No Rales, No Rhonchi, No Wheezing


Gastrointestinal/Abdominal: Soft, No Tenderness


Extremity: Capillary Refill (<2 seconds)


Neurological/Psych: Oriented x3, Normal Speech, Normal Cognition





ED Course And Treatment


O2 Sat by Pulse Oximetry: 98 (in RA)





Medical Decision Making


Medical Decision Making: 


Impression: 73 year old female sent from Dr. Carrington's clinic for admission for 

visual disturbances and high blood pressure. 








Plan:


Patient admitted to Telemetry under Dr. Carrington


Patient given Apresoline IVP





Disposition


Discussed With : Randall Carrington





- Disposition


Disposition: HOSPITALIZED


Disposition Time: 18:09


Condition: GUARDED





- Clinical Impression


Clinical Impression: 


 Hypertension








- Scribe Statement


The provider has reviewed the documentation as recorded by the Scribe (Yodit Fox)








All medical record entries made by the Scribe were at my direction and 

personally dictated by me. I have reviewed the chart and agree that the record 

accurately reflects my personal performance of the history, physical exam, 

medical decision making, and the department course for this patient. I have also

personally directed, reviewed, and agree with the discharge instructions and 

disposition.





Decision To Admit





- Pt Status Changed To:


Hospital Disposition Of: Observation





- .


Bed Request Type: Telemetry


Admitting Physician: Randall Carrington


Patient Diagnosis: 


 Hypertension

## 2019-04-04 NOTE — CP.PCM.HP
History of Present Illness





- History of Present Illness


History of Present Illness: 





Chief complaint:


Elevated blood pressure


HPI:


73-year-old female came to the emergency room after she visited cardiologist 

today, patient went to see the cardiologist today as a routine follow-up visit, 

during that time patient was noted to have very highly elevated blood pressure, 

without any symptoms.  At the time patient was sent to the emergency room 

because of the very highly elevated blood pressure in spite of multiple medica

tions.  Patient did not have any symptoms including headache, no chest pain or 

shortness of breath.  But the patient is suffering from severe pain over the 

right knee joint, patient had a right knee replacement in January 2018, after 

the surgery patient started having increasing symptoms.  Patient was seen by 

orthopedic surgeon as a second opinion, and concerned that she has had some 

changes which needs to be addressed and she may need revision surgery, but the 

patient is currently being managed by physical therapy.


When she is walking her symptoms of pain improved, but when she is resting, when

she is lying down the pain is excruciating, taking pain medications.





Patient currently having no other acute symptoms, currently the blood pressure 

slightly better, received hydralazine IV in the emergency room.





Past medical history:


Hypertension, hypercholesterolemia, CAD, status post stent, osteoarthritis





Surgical history:


Right knee total knee replacement done 1 year ago


She had left circumflex stent.


Personal history:


Non-smoker nonalcoholic


Patient is currently retired


Allergies no known drug allergies


Family history significant for high blood pressure and diabetes.





Review of system:


Patient is currently having no headache, denies any chest pain or shortness of 

breath, severe right knee pain noted, has minimal swelling noted, unable to move

around, but once she started walking she is feeling some improvement in the pain

noted





On examination:


Vital signs are stable otherwise, elevated systolic blood pressure noted, chest 

good air entry bilaterally, regular Hs, no pedal edema noted


CNS alert awake oriented x3 no functional neurological deficit





, nontender abdomen





EKG showing evidence of no ST-T changes noted.


But the T wave inversion in the lateral leads noted nonspecific.


Assessment and recommendation:


73-year-old female with a history of CAD, hypertension, status post stent, 

hypercholesterolemia, and history of suspected COPD admitted to the hospital 

with acute and elevated blood pressure.


 no significant symptoms.


Labs reviewed


Continue to monitor the blood pressure.


Nephrology evaluation.


Cardiology workup.


DVT and GI prophylaxis.


We will follow the patient





Present on Admission





- Present on Admission


Any Indicators Present on Admission: No


History of DVT/PE: No


History of Uncontrolled Diabetes: No


Urinary Catheter: No


Decubitus Ulcer Present: No





Past Patient History





- Infectious Disease


Hx of Infectious Diseases: None





- Past Social History


Smoking Status: Never Smoked





- CARDIAC


Hx Hypertension: Yes





- PULMONARY


Hx Bronchitis: Yes


Hx Chronic Obstructive Pulmonary Disease (COPD): Yes





- NEUROLOGICAL


Hx Neurological Disorder: No





- HEENT


Hx HEENT Problems: No





- RENAL


Hx Chronic Kidney Disease: No





- ENDOCRINE/METABOLIC


Hx Endocrine Disorders: No





- HEMATOLOGICAL/ONCOLOGICAL


Hx Blood Disorders: No


Hx Blood Transfusions: No





- INTEGUMENTARY


Hx Dermatological Problems: No





- MUSCULOSKELETAL/RHEUMATOLOGICAL


Hx Arthritis: Yes





- GASTROINTESTINAL


Hx Gastrointestinal Disorders: Yes


Hx Gastroesophageal Reflux: Yes





- GENITOURINARY/GYNECOLOGICAL


Hx Genitourinary Disorders: No





- PSYCHIATRIC


Hx Substance Use: No





- SURGICAL HISTORY


Hx Surgeries: Yes


Hx Orthopedic Surgery: Yes (Right knee replacement)





- ANESTHESIA


Hx Anesthesia: Yes


Hx Anesthesia Reactions: No


Hx Malignant Hyperthermia: No





Meds


Allergies/Adverse Reactions: 


                                    Allergies











Allergy/AdvReac Type Severity Reaction Status Date / Time


 


No Known Allergies Allergy   Verified 04/04/19 16:54














Results





- Vital Signs


Recent Vital Signs: 





                                Last Vital Signs











Temp  98 F   04/04/19 20:07


 


Pulse  69   04/04/19 20:07


 


Resp  20   04/04/19 20:07


 


BP  192/94 H  04/04/19 20:07


 


Pulse Ox  98   04/04/19 20:07














- Labs


Result Diagrams: 


                                 04/04/19 19:43





                                 04/04/19 19:43


Labs: 





                         Laboratory Results - last 24 hr











  04/04/19 04/04/19





  19:43 19:43


 


WBC  4.7 L 


 


RBC  5.06 


 


Hgb  13.8  D 


 


Hct  43.5 


 


MCV  85.9 


 


MCH  27.2 


 


MCHC  31.7 L 


 


RDW  15.4 H 


 


Plt Count  178 


 


MPV  10.6 


 


Neut % (Auto)  50.2 


 


Lymph % (Auto)  36.8 


 


Mono % (Auto)  10.4 H 


 


Eos % (Auto)  2.0 


 


Baso % (Auto)  0.6 


 


Neut # (Auto)  2.4 


 


Lymph # (Auto)  1.7 


 


Mono # (Auto)  0.5 


 


Eos # (Auto)  0.1 


 


Baso # (Auto)  0.0 


 


Sodium   140


 


Potassium   4.3


 


Chloride   107


 


Carbon Dioxide   25


 


Anion Gap   12


 


BUN   18 H


 


Creatinine   0.9


 


Est GFR ( Amer)   > 60


 


Est GFR (Non-Af Amer)   > 60


 


Random Glucose   87


 


Calcium   9.2


 


Total Bilirubin   0.4


 


AST   24


 


ALT   8 L D


 


Alkaline Phosphatase   131 H D


 


Troponin I   < 0.0120


 


Total Protein   7.1


 


Albumin   4.1


 


Globulin   3.0


 


Albumin/Globulin Ratio   1.3

## 2019-04-05 LAB
BILIRUB UR-MCNC: NEGATIVE MG/DL
GLUCOSE UR STRIP-MCNC: NORMAL MG/DL
LEUKOCYTE ESTERASE UR-ACNC: (no result) LEU/UL
PH UR STRIP: 6 [PH] (ref 5–8)
PROT UR STRIP-MCNC: (no result) MG/DL
RBC # UR STRIP: NEGATIVE /UL
SP GR UR STRIP: 1.01 (ref 1–1.03)
SQUAMOUS EPITHIAL: 2 /HPF (ref 0–5)
UROBILINOGEN UR-MCNC: NORMAL MG/DL (ref 0.2–1)

## 2019-04-05 RX ADMIN — METOPROLOL SUCCINATE SCH MG: 100 TABLET, EXTENDED RELEASE ORAL at 09:24

## 2019-04-05 RX ADMIN — ENOXAPARIN SODIUM SCH: 40 INJECTION SUBCUTANEOUS at 09:24

## 2019-04-05 NOTE — CP.PCM.CON
History of Present Illness





- History of Present Illness


History of Present Illness: 


Nephrology Consultation Note:





Assessment: Stable


uncontrolled severe HTN with urgency


CAD s/p stent and hyperlipidemia 


Vit d deficiency


osteoarthritis s/p Rt TKR


LVH


COPD





Plan


No acute need for renal replacement therapy at this time.


Hypertension control with meds as ordered. Maintain hemodynamics stable. Avoid 

hypotension. 


Patient on losartan 100 mg/d. also on toprol XL. resume CCB as norvasc 10 mg/d. 

add thiazide diuretic as chlorthalidone 25 mg/d. in addition will give prn 

hydralazine. 


Monitor Input/Output, daily weights and renal function with basic metabolic 

panel


supplement lytes as needed





Check UA, urine spot protein/creatinine, albumin/creatinine ratio, urine Na


Secondary HTN work up with plasma renin/aldosterone, plasma metanephrine and 

renal artery Doppler to r/o renal artery stenosis, TSH, Vit D level





Dose meds/antibiotics (if needed) for GFR>60


Glycemic control. 


Further work up/management as per primary team





Thanks for allowing me to participate in care of your patient. Will follow 

patient with you. Please call if any Qs. had d/w team


Dr Ryan Lizarraga


Office: [phone] Cell: [phone] Fax: [phone]





Chief Complaint; high BP


Reason for consult: HTN urgency


HPI: Pt is a 73 F with hx of hypertension (40 years) CAD s/p stent, COPD, 

osteoarthritis s/p Rt TKR and hyperlipidemia presented with complaints of BP in 

220 range hence admitted with HTN urgency. 


Denies OTC/herbal meds or NSAIDs


No recent iodinated contrast exposure. 


pt says BP has been high for last few months. has been better controlled prior 

to that 


denies smoking/etoh/drugs/nsaids/decongestants as sudafed/otc meds/licorice


has allergic nasal congestion. she feels in usual health otherwis





ROS: 


Cardiovascular: No chest pain. 


Pulmonary: No shortness of breath 


Gastrointestinal: denies abdominal pain No nausea. No vomiting. 


Genitourinary: No pain while urinating. Denies blood in urine. 


All other negative except as mentioned in HPI. 





Physical Examination:      


General Appearance: Comfortable, in no acute respiratory distress, co-operative 

.


Vitals reviewed and noted as below


Head; Atraumatic, normocephalic


ENT: no ulcers no thrush. Tongue is midline. Oropharynx: no rash or ulcers.


EYES: Pupils are equal, round and reactive to light accommodation. Eye muscles 

and extraocular movement intact. Sclera is anicteric.


Neck; supple no lymphadenopathy, no thyromegaly or bruit


Lungs: Normal respiratory rate/effort. Breath sounds bilateral equal and clear


Heart: Normal rate. s1s2 normal. No rub or gallop. 


Extremities: no edema. No varicose veins


Neurological: Patient is alert, awake and oriented to person, place and time. No

focal deficit. Strength bilateral appropriate and equal


Skin: Warm and dry. Normal turgor. No rash. Palpitation: Normal elasticity for 

age


Abdomen: Abdomen is soft. Bowel sounds +. There is no abdominal tenderness, no 

guarding/rigidity no organomegaly


Psych: normal insight and normal affect/mood


MSK: no joint tenderness or swelling. Digits and nails normal, no deformity


: kidney or bladder not palpable





Labs/imaging reviewed.


Past medical history, past surgical history, family history, social history, 

allergy reviewed and noted as below


Family hx: no hx of CKD. Rest non-contributory 





echo: mild concentric LVH


renal imaging 2018 WNL including adrenals





Past Patient History





- Infectious Disease


Hx of Infectious Diseases: None





- Past Social History


Smoking Status: Never Smoked





- CARDIAC


Hx Hypertension: Yes





- PULMONARY


Hx Bronchitis: Yes


Hx Chronic Obstructive Pulmonary Disease (COPD): Yes





- NEUROLOGICAL


Hx Neurological Disorder: No





- HEENT


Hx HEENT Problems: No





- RENAL


Hx Chronic Kidney Disease: No





- ENDOCRINE/METABOLIC


Hx Endocrine Disorders: No





- HEMATOLOGICAL/ONCOLOGICAL


Hx Blood Disorders: No


Hx Blood Transfusions: No





- INTEGUMENTARY


Hx Dermatological Problems: No





- MUSCULOSKELETAL/RHEUMATOLOGICAL


Hx Arthritis: Yes





- GASTROINTESTINAL


Hx Gastrointestinal Disorders: Yes


Hx Gastroesophageal Reflux: Yes





- GENITOURINARY/GYNECOLOGICAL


Hx Genitourinary Disorders: No





- PSYCHIATRIC


Hx Substance Use: No





- SURGICAL HISTORY


Hx Surgeries: Yes


Hx Orthopedic Surgery: Yes (Right knee replacement)





- ANESTHESIA


Hx Anesthesia: Yes


Hx Anesthesia Reactions: No


Hx Malignant Hyperthermia: No





Meds


Allergies/Adverse Reactions: 


                                    Allergies











Allergy/AdvReac Type Severity Reaction Status Date / Time


 


No Known Allergies Allergy   Verified 04/04/19 16:54














- Medications


Medications: 


                               Current Medications





Acetaminophen/Codeine Phosphate (Tylenol/Codeine 300 Mg/30 Mg)  1 ea PO Q6 PRN


   PRN Reason: Pain, moderate (4-7)


Amlodipine Besylate (Norvasc)  10 mg PO DAILY [facility]


   Last Admin: 04/05/19 13:01 Dose:  10 mg


Aspirin (Ecotrin)  81 mg PO DAILY [facility]


   Last Admin: 04/05/19 09:25 Dose:  81 mg


Chlorthalidone (Hygroton)  25 mg PO DAILY [facility]


   Last Admin: 04/05/19 13:01 Dose:  25 mg


Clopidogrel Bisulfate (Plavix)  75 mg PO DAILY [facility]


   Last Admin: 04/05/19 09:23 Dose:  75 mg


Enoxaparin Sodium (Lovenox)  40 mg SC DAILY [facility]


   Last Admin: 04/05/19 09:24 Dose:  Not Given


Ergocalciferol (Drisdol 50,000 Intl Units Cap)  1 cap PO QWK [facility]


Famotidine (Pepcid)  20 mg PO HS [facility]


   Last Admin: 04/04/19 22:29 Dose:  20 mg


Fluticasone/Vilanterol (Breo Ellipta 100-25 Mcg Inh)  1 puff INH RQ24 [facility]


Hydralazine HCl (Apresoline)  25 mg PO Q4 PRN


   PRN Reason: Other


Losartan Potassium (Cozaar)  100 mg PO DAILY [facility]


   Last Admin: 04/05/19 09:24 Dose:  100 mg


Metoprolol Succinate (Toprol Xl)  100 mg PO DAILY [facility]


   Last Admin: 04/05/19 09:24 Dose:  100 mg


Rosuvastatin Calcium (Crestor)  20 mg PO HS [facility]


   Last Admin: 04/04/19 22:29 Dose:  20 mg


Tiotropium Bromide (Spiriva Inhalation Handihaler Device)  1 inhaler INH RQD [facility]


Tiotropium Bromide (Spiriva)  18 mcg INH RQ24 [facility]











Results





- Vital Signs


Recent Vital Signs: 


                                Last Vital Signs











Temp  98.3 F   04/04/19 23:15


 


Pulse  61   04/05/19 13:57


 


Resp  18   04/04/19 23:15


 


BP  193/92 H  04/05/19 13:03


 


Pulse Ox  98   04/04/19 23:15














- Labs


Result Diagrams: 


                                 04/04/19 19:43





                                 04/04/19 19:43


Labs: 


                         Laboratory Results - last 24 hr











  04/04/19 04/04/19





  19:43 19:43


 


WBC  4.7 L 


 


RBC  5.06 


 


Hgb  13.8  D 


 


Hct  43.5 


 


MCV  85.9 


 


MCH  27.2 


 


MCHC  31.7 L 


 


RDW  15.4 H 


 


Plt Count  178 


 


MPV  10.6 


 


Neut % (Auto)  50.2 


 


Lymph % (Auto)  36.8 


 


Mono % (Auto)  10.4 H 


 


Eos % (Auto)  2.0 


 


Baso % (Auto)  0.6 


 


Neut # (Auto)  2.4 


 


Lymph # (Auto)  1.7 


 


Mono # (Auto)  0.5 


 


Eos # (Auto)  0.1 


 


Baso # (Auto)  0.0 


 


Sodium   140


 


Potassium   4.3


 


Chloride   107


 


Carbon Dioxide   25


 


Anion Gap   12


 


BUN   18 H


 


Creatinine   0.9


 


Est GFR ( Amer)   > 60


 


Est GFR (Non-Af Amer)   > 60


 


Random Glucose   87


 


Calcium   9.2


 


Total Bilirubin   0.4


 


AST   24


 


ALT   8 L D


 


Alkaline Phosphatase   131 H D


 


Troponin I   < 0.0120


 


Total Protein   7.1


 


Albumin   4.1


 


Globulin   3.0


 


Albumin/Globulin Ratio   1.3

## 2019-04-06 RX ADMIN — METOPROLOL SUCCINATE SCH MG: 100 TABLET, EXTENDED RELEASE ORAL at 09:12

## 2019-04-06 RX ADMIN — TIOTROPIUM BROMIDE SCH: 18 CAPSULE ORAL; RESPIRATORY (INHALATION) at 08:30

## 2019-04-06 RX ADMIN — ENOXAPARIN SODIUM SCH: 40 INJECTION SUBCUTANEOUS at 09:16

## 2019-04-06 NOTE — CP.PCM.PN
Subjective





- Date & Time of Evaluation


Date of Evaluation: 04/06/19


Time of Evaluation: 15:08





- Subjective


Subjective: 





Nephrology Consultation Note:





Assessment: Stable


uncontrolled severe HTN with urgency


CAD s/p stent and hyperlipidemia 


Vit d deficiency


osteoarthritis s/p Rt TKR


LVH


COPD





Plan


No acute need for renal replacement therapy at this time.


Hypertension control with meds as ordered. Maintain hemodynamics stable. Avoid 

hypotension. 


Patient on losartan 100 mg/d. also on toprol XL. resume CCB as norvasc 10 mg/d. 

add thiazide diuretic as chlorthalidone 25 mg/d. in addition will give prn 

hydralazine. 


Monitor Input/Output, daily weights and renal function with basic metabolic 

panel


supplement lytes as needed


claritin for few days





Check UA, urine spot protein/creatinine, albumin/creatinine ratio, urine Na


Secondary HTN work up with plasma renin/aldosterone, plasma metanephrine and 

renal artery Doppler to r/o renal artery stenosis, TSH, Vit D level





Dose meds/antibiotics (if needed) for GFR>60


Glycemic control. 


Further work up/management as per primary team





Thanks for allowing me to participate in care of your patient. Will follow 

patient with you. Please call if any Qs. had d/w team


Dr Ryan Lizarraga


Office: [phone] Cell: [phone] Fax: [phone]





Chief Complaint; high BP


Reason for consult: HTN urgency


HPI: Pt is a 73 F with hx of hypertension (40 years) CAD s/p stent, COPD, 

osteoarthritis s/p Rt TKR and hyperlipidemia presented with complaints of BP in 

220 range hence admitted with HTN urgency. 


Denies OTC/herbal meds or NSAIDs


No recent iodinated contrast exposure. 


pt says BP has been high for last few months. has been better controlled prior 

to that 


denies smoking/etoh/drugs/nsaids/decongestants as sudafed/otc meds/licorice


has allergic nasal congestion. she feels in usual health otherwis





ROS: has nasal congestion and sneezng


Cardiovascular: No chest pain. 


Pulmonary: No shortness of breath 


Gastrointestinal: denies abdominal pain No nausea. No vomiting. 


Genitourinary: No pain while urinating. Denies blood in urine. 


All other negative except as mentioned in HPI. 





Physical Examination:      


General Appearance: Comfortable, in no acute respiratory distress, co-operative 

.


Vitals reviewed and noted as below


Head; Atraumatic, normocephalic


ENT: no ulcers no thrush. Tongue is midline. Oropharynx: no rash or ulcers.


EYES: Pupils are equal, round and reactive to light accommodation. Eye muscles 

and extraocular movement intact. Sclera is anicteric.


Neck; supple no lymphadenopathy, no thyromegaly or bruit


Lungs: Normal respiratory rate/effort. Breath sounds bilateral equal and clear


Heart: Normal rate. s1s2 normal. No rub or gallop. 


Extremities: no edema. No varicose veins


Neurological: Patient is alert, awake and oriented to person, place and time. No

focal deficit. Strength bilateral appropriate and equal


Skin: Warm and dry. Normal turgor. No rash. Palpitation: Normal elasticity for 

age


Abdomen: Abdomen is soft. Bowel sounds +. There is no abdominal tenderness, no 

guarding/rigidity no organomegaly


Psych: normal insight and normal affect/mood


MSK: no joint tenderness or swelling. Digits and nails normal, no deformity


: kidney or bladder not palpable





Labs/imaging reviewed.


Past medical history, past surgical history, family history, social history, 

allergy reviewed and noted as below


Family hx: no hx of CKD. Rest non-contributory 





echo: mild concentric LVH


renal imaging 2018 WNL including adrenals








Objective





- Vital Signs/Intake and Output


Vital Signs (last 24 hours): 


                                        











Temp Pulse Resp BP Pulse Ox


 


 97.9 F   63   18   156/84 H  98 


 


 04/06/19 07:07  04/06/19 11:54  04/06/19 07:07  04/06/19 09:15  04/06/19 11:55








Intake and Output: 


                                        











 04/06/19 04/06/19





 06:59 18:59


 


Intake Total 240 300


 


Balance 240 300














- Medications


Medications: 


                               Current Medications





Acetaminophen/Codeine Phosphate (Tylenol/Codeine 300 Mg/30 Mg)  1 ea PO Q6 PRN


   PRN Reason: Pain, moderate (4-7)


Amlodipine Besylate (Norvasc)  10 mg PO DAILY [facility]


   Last Admin: 04/06/19 09:12 Dose:  10 mg


Aspirin (Ecotrin)  81 mg PO DAILY [facility]


   Last Admin: 04/06/19 09:12 Dose:  81 mg


Chlorthalidone (Hygroton)  25 mg PO DAILY [facility]


   Last Admin: 04/06/19 09:11 Dose:  25 mg


Clopidogrel Bisulfate (Plavix)  75 mg PO DAILY [facility]


   Last Admin: 04/06/19 09:12 Dose:  75 mg


Enoxaparin Sodium (Lovenox)  40 mg SC DAILY [facility]


   Last Admin: 04/06/19 09:16 Dose:  Not Given


Ergocalciferol (Drisdol 50,000 Intl Units Cap)  1 cap PO QWK [facility]


Famotidine (Pepcid)  20 mg PO HS [facility]


   Last Admin: 04/05/19 23:27 Dose:  Not Given


Fluticasone/Vilanterol (Breo Ellipta 100-25 Mcg Inh)  1 puff INH RQ24 [facility]


Hydralazine HCl (Apresoline)  25 mg PO Q4 PRN


   PRN Reason: Other


   Last Admin: 04/06/19 07:41 Dose:  25 mg


Losartan Potassium (Cozaar)  100 mg PO DAILY [facility]


   Last Admin: 04/06/19 09:11 Dose:  100 mg


Metoprolol Succinate (Toprol Xl)  100 mg PO DAILY [facility]


   Last Admin: 04/06/19 09:12 Dose:  100 mg


Rosuvastatin Calcium (Crestor)  20 mg PO HS [facility]


   Last Admin: 04/05/19 23:26 Dose:  Not Given


Tiotropium Bromide (Spiriva Inhalation Handihaler Device)  1 inhaler INH RQD [facility]


Tiotropium Bromide (Spiriva)  18 mcg INH RQ24 [facility]


   Last Admin: 04/06/19 08:30 Dose:  Not Given











- Labs


Labs: 


                                        





                                 04/04/19 19:43 





                                 04/04/19 19:43

## 2019-04-06 NOTE — CP.PCM.CON
History of Present Illness





- History of Present Illness


History of Present Illness: 





73 year old female sent from my office for admission for visual disturbances and

high blood pressure. Patient states that she  has some eye irritation and her 

vision becomes blurry when she blinks. Patient has a PMHx of hypertension, CAD 

s/p stent placed last year, and total right knee replacement. Patient states 

that she was at 's office for a routine visit. Patient states that she 

tool her hypertension medication this morning. Patient also complains of some  

right knee pain. Patient denies headache, dizziness, SOB, chest pain, and 

palpitations. 





Chief Complaint (Nursing): High Blood Pressure


History Per: Patient


History/Exam Limitations: no limitations


Current Symptoms Are (Timing): Still Present


Associated Symptoms: Blurred Vision.  denies: Chest Pain, Dizziness, Headache








- Medical History


PMH: Arthritis, Bronchitis, CAD (s/p stents placed 1 year ago), COPD, GERD, HTN


   Denies: Chronic Kidney Disease


Other Surgeries: Total knee replacement (right knee)





- [facility] Procedures











 (01/31/18)


CYSTOCELE REPAIR (07/06/98)


CYSTOGRAM NEC (04/27/98)


CYSTOMETROGRAM (04/27/98)


CYSTOSCOPY NEC (07/06/98)


FLUOROSCOPY OF MULT COR ART USING L OSM CONTRAST (03/06/18)


MEASURE OF CARDIAC SAMPL & PRESSURE, L HEART, PERC APPROACH (03/06/18)


OTHER CYSTOSTOMY (07/06/98)


SUPRAPUBIC SLING OP (07/06/98)








Family History: States: Unknown Family Hx





- Social History


Hx Tobacco Use: No


Hx Alcohol Use: No


Hx Substance Use: No





- Immunization History


Hx Tetanus Toxoid Vaccination: Yes


Hx Influenza Vaccination: Yes (1/2018)


Hx Pneumococcal Vaccination: Yes (1/2018)





 Review Of Systems 


Eyes: Positive for: Vision Change (blurry vision with blinking from eye 

irritation)


Cardiovascular: Negative for: Chest Pain, Palpitations


Respiratory: Negative for: Shortness of Breath


Musculoskeletal: Positive for: Other (right knee pain )


Neurological: Negative for: Headache, Dizziness





 Physical Exam 





- Physical Exam


Appears: Non-toxic, Other (hypertensive)


Skin: Normal Color, Warm, Dry


Head: Atraumatic, Normacephalic


Eye(s): bilateral: Normal Inspection, PERRL


Neck: Normal ROM, Supple


Chest: Symmetrical, No Deformity


Cardiovascular: Rhythm Regular, No Murmur


Respiratory: No Accessory Muscle Use, No Rales, No Rhonchi, No Wheezing


Gastrointestinal/Abdominal: Soft, No Tenderness


Extremity: Capillary Refill (<2 seconds)


Neurological/Psych: Oriented x3, Normal Speech, Normal Cognition








Past Patient History





- Infectious Disease


Hx of Infectious Diseases: None





- Past Social History


Smoking Status: Never Smoked





- CARDIAC


Hx Hypertension: Yes





- PULMONARY


Hx Bronchitis: Yes


Hx Chronic Obstructive Pulmonary Disease (COPD): Yes





- NEUROLOGICAL


Hx Neurological Disorder: No





- HEENT


Hx HEENT Problems: No





- RENAL


Hx Chronic Kidney Disease: No





- ENDOCRINE/METABOLIC


Hx Endocrine Disorders: No





- HEMATOLOGICAL/ONCOLOGICAL


Hx Blood Disorders: No


Hx Blood Transfusions: No





- INTEGUMENTARY


Hx Dermatological Problems: No





- MUSCULOSKELETAL/RHEUMATOLOGICAL


Hx Arthritis: Yes





- GASTROINTESTINAL


Hx Gastrointestinal Disorders: Yes


Hx Gastroesophageal Reflux: Yes





- GENITOURINARY/GYNECOLOGICAL


Hx Genitourinary Disorders: No





- PSYCHIATRIC


Hx Substance Use: No





- SURGICAL HISTORY


Hx Surgeries: Yes


Hx Orthopedic Surgery: Yes (Right knee replacement)





- ANESTHESIA


Hx Anesthesia: Yes


Hx Anesthesia Reactions: No


Hx Malignant Hyperthermia: No





Meds


Allergies/Adverse Reactions: 


                                    Allergies











Allergy/AdvReac Type Severity Reaction Status Date / Time


 


No Known Allergies Allergy   Verified 04/04/19 16:54














- Medications


Medications: 


                               Current Medications





Acetaminophen/Codeine Phosphate (Tylenol/Codeine 300 Mg/30 Mg)  1 ea PO Q6 PRN


   PRN Reason: Pain, moderate (4-7)


Amlodipine Besylate (Norvasc)  10 mg PO DAILY [facility]


   Last Admin: 04/05/19 13:01 Dose:  10 mg


Aspirin (Ecotrin)  81 mg PO DAILY [facility]


   Last Admin: 04/05/19 09:25 Dose:  81 mg


Chlorthalidone (Hygroton)  25 mg PO DAILY [facility]


   Last Admin: 04/05/19 13:01 Dose:  25 mg


Clopidogrel Bisulfate (Plavix)  75 mg PO DAILY [facility]


   Last Admin: 04/05/19 09:23 Dose:  75 mg


Enoxaparin Sodium (Lovenox)  40 mg SC DAILY [facility]


   Last Admin: 04/05/19 09:24 Dose:  Not Given


Ergocalciferol (Drisdol 50,000 Intl Units Cap)  1 cap PO QWK [facility]


Famotidine (Pepcid)  20 mg PO HS [facility]


   Last Admin: 04/05/19 23:27 Dose:  Not Given


Fluticasone/Vilanterol (Breo Ellipta 100-25 Mcg Inh)  1 puff INH RQ24 [facility]


Hydralazine HCl (Apresoline)  25 mg PO Q4 PRN


   PRN Reason: Other


   Last Admin: 04/06/19 07:41 Dose:  25 mg


Losartan Potassium (Cozaar)  100 mg PO DAILY [facility]


   Last Admin: 04/05/19 09:24 Dose:  100 mg


Metoprolol Succinate (Toprol Xl)  100 mg PO DAILY [facility]


   Last Admin: 04/05/19 09:24 Dose:  100 mg


Rosuvastatin Calcium (Crestor)  20 mg PO HS [facility]


   Last Admin: 04/05/19 23:26 Dose:  Not Given


Tiotropium Bromide (Spiriva Inhalation Handihaler Device)  1 inhaler INH RQD [facility]


Tiotropium Bromide (Spiriva)  18 mcg INH RQ24 [facility]


   Last Admin: 04/06/19 08:30 Dose:  Not Given











Results





- Vital Signs


Recent Vital Signs: 


                                Last Vital Signs











Temp  97.9 F   04/06/19 07:07


 


Pulse  65   04/06/19 08:00


 


Resp  18   04/06/19 07:07


 


BP  181/88 H  04/06/19 07:45


 


Pulse Ox  98   04/06/19 07:59














- Labs


Result Diagrams: 


                                 04/04/19 19:43





                                 04/04/19 19:43


Labs: 


                         Laboratory Results - last 24 hr











  04/05/19 04/05/19 04/06/19





  22:54 22:54 06:53


 


25-OH Vitamin D Total    25.2 L


 


TSH 3rd Generation   


 


Urine Color  Straw  


 


Urine Clarity  Clear  


 


Urine pH  6.0  


 


Ur Specific Gravity  1.008  


 


Urine Protein  1+ H  


 


Urine Glucose (UA)  Normal  


 


Urine Ketones  Negative  


 


Urine Blood  Negative  


 


Urine Nitrate  Negative  


 


Urine Bilirubin  Negative  


 


Urine Urobilinogen  Normal  


 


Ur Leukocyte Esterase  Neg  


 


Urine WBC (Auto)  < 1  


 


Urine RBC (Auto)  1  


 


Ur Squamous Epith Cells  2  


 


U Random Total Protein   58.0 H 


 


Ur Random Sodium   91 














  04/06/19





  06:53


 


25-OH Vitamin D Total 


 


TSH 3rd Generation  1.73


 


Urine Color 


 


Urine Clarity 


 


Urine pH 


 


Ur Specific Gravity 


 


Urine Protein 


 


Urine Glucose (UA) 


 


Urine Ketones 


 


Urine Blood 


 


Urine Nitrate 


 


Urine Bilirubin 


 


Urine Urobilinogen 


 


Ur Leukocyte Esterase 


 


Urine WBC (Auto) 


 


Urine RBC (Auto) 


 


Ur Squamous Epith Cells 


 


U Random Total Protein 


 


Ur Random Sodium 














Assessment & Plan





- Assessment and Plan (Free Text)


Assessment: 





73-year-old female with a history of CAD, hypertension, status post stent, 

hypercholesterolemia, and history of suspected COPD admitted to the hospital 

with acute and elevated blood pressure.


 no significant symptoms.


Labs reviewed


Continue to monitor the blood pressure.


Nephrology evaluation.


Cardiology workup.


DVT and GI prophylaxis.


We will follow the patient

## 2019-04-07 RX ADMIN — METOPROLOL SUCCINATE SCH MG: 100 TABLET, EXTENDED RELEASE ORAL at 09:18

## 2019-04-07 RX ADMIN — TIOTROPIUM BROMIDE SCH: 18 CAPSULE ORAL; RESPIRATORY (INHALATION) at 08:27

## 2019-04-07 RX ADMIN — ENOXAPARIN SODIUM SCH MG: 40 INJECTION SUBCUTANEOUS at 09:19

## 2019-04-07 NOTE — CP.PCM.PN
Subjective





- Date & Time of Evaluation


Date of Evaluation: 04/06/19


Time of Evaluation: 17:20





- Subjective


Subjective: 


Patient seen and evaluated


Denies chest pain and dyspnea


Renal consult appreciated





 Review Of Systems 


Eyes: Positive for: Vision Change (blurry vision with blinking from eye 

irritation)


Cardiovascular: Negative for: Chest Pain, Palpitations


Respiratory: Negative for: Shortness of Breath


Musculoskeletal: Positive for: Other (right knee pain )


Neurological: Negative for: Headache, Dizziness





 Physical Exam 





- Physical Exam


Appears: Non-toxic, Other (hypertensive)


Skin: Normal Color, Warm, Dry


Head: Atraumatic, Normacephalic


Eye(s): bilateral: Normal Inspection, PERRL


Neck: Normal ROM, Supple


Chest: Symmetrical, No Deformity


Cardiovascular: Rhythm Regular, No Murmur


Respiratory: No Accessory Muscle Use, No Rales, No Rhonchi, No Wheezing


Gastrointestinal/Abdominal: Soft, No Tenderness


Extremity: Capillary Refill (<2 seconds)


Neurological/Psych: Oriented x3, Normal Speech, Normal Cognition








Assessment & Plan





- Assessment and Plan (Free Text)


Assessment: 





73-year-old female with a history of CAD, hypertension, status post stent, 

hypercholesterolemia, and history of suspected COPD admitted to the hospital 

with acute and elevated blood pressure.


 no significant symptoms.


Labs reviewed


Continue to monitor the blood pressure.


Nephrology evaluation.


DVT and GI prophylaxis.


We will follow the patient





Stress test Monday











Objective





- Vital Signs/Intake and Output


Vital Signs (last 24 hours): 


                                        











Temp Pulse Resp BP Pulse Ox


 


 98 F   65   18   162/79 H  99 


 


 04/07/19 07:00  04/07/19 07:09  04/07/19 07:00  04/07/19 07:00  04/07/19 07:47








Intake and Output: 


                                        











 04/07/19 04/07/19





 06:59 18:59


 


Intake Total 10 


 


Balance 10 














- Medications


Medications: 


                               Current Medications





Acetaminophen/Codeine Phosphate (Tylenol/Codeine 300 Mg/30 Mg)  1 ea PO Q6 PRN


   PRN Reason: Pain, moderate (4-7)


Amlodipine Besylate (Norvasc)  10 mg PO DAILY [facility]


   Last Admin: 04/07/19 09:18 Dose:  10 mg


Aspirin (Ecotrin)  81 mg PO DAILY [facility]


   Last Admin: 04/07/19 09:18 Dose:  81 mg


Chlorthalidone (Hygroton)  25 mg PO DAILY [facility]


   Last Admin: 04/07/19 09:33 Dose:  25 mg


Clopidogrel Bisulfate (Plavix)  75 mg PO DAILY [facility]


   Last Admin: 04/07/19 09:18 Dose:  75 mg


Enoxaparin Sodium (Lovenox)  40 mg SC DAILY [facility]


   Last Admin: 04/07/19 09:19 Dose:  40 mg


Ergocalciferol (Drisdol 50,000 Intl Units Cap)  1 cap PO QWK [facility]


Famotidine (Pepcid)  20 mg PO HS [facility]


   Last Admin: 04/06/19 21:37 Dose:  20 mg


Fluticasone/Vilanterol (Breo Ellipta 100-25 Mcg Inh)  1 puff INH RQ24 [facility]


Hydralazine HCl (Apresoline)  25 mg PO Q4 PRN


   PRN Reason: Other


   Last Admin: 04/06/19 07:41 Dose:  25 mg


Hydralazine HCl (Apresoline)  50 mg PO BID [facility]


   Last Admin: 04/07/19 09:18 Dose:  50 mg


Loratadine (Claritin)  10 mg PO DAILY [facility]


   Stop: 04/12/19 10:01


   Last Admin: 04/07/19 09:19 Dose:  10 mg


Losartan Potassium (Cozaar)  100 mg PO DAILY [facility]


   Last Admin: 04/07/19 09:18 Dose:  100 mg


Metoprolol Succinate (Toprol Xl)  100 mg PO DAILY [facility]


   Last Admin: 04/07/19 09:18 Dose:  100 mg


Rosuvastatin Calcium (Crestor)  20 mg PO HS [facility]


   Last Admin: 04/06/19 21:37 Dose:  20 mg


Tiotropium Bromide (Spiriva Inhalation Handihaler Device)  1 inhaler INH RQD [facility]


Tiotropium Bromide (Spiriva)  18 mcg INH RQ24 [facility]


   Last Admin: 04/07/19 08:27 Dose:  Not Given











- Labs


Labs: 


                                        





                                 04/04/19 19:43 





                                 04/04/19 19:43

## 2019-04-07 NOTE — CP.PCM.PN
Subjective





- Date & Time of Evaluation


Date of Evaluation: 04/07/19


Time of Evaluation: 13:05





- Subjective


Subjective: 





Patient seen and evaluated


Denies chest pain and dyspnea


BP stable





Patient for stress test and ECHO in am





 Review Of Systems 


Eyes: Positive for: Vision Change (blurry vision with blinking from eye 

irritation)


Cardiovascular: Negative for: Chest Pain, Palpitations


Respiratory: Negative for: Shortness of Breath


Musculoskeletal: Positive for: Other (right knee pain )


Neurological: Negative for: Headache, Dizziness





 Physical Exam 





- Physical Exam


Appears: Non-toxic, Other (hypertensive)


Skin: Normal Color, Warm, Dry


Head: Atraumatic, Normacephalic


Eye(s): bilateral: Normal Inspection, PERRL


Neck: Normal ROM, Supple


Chest: Symmetrical, No Deformity


Cardiovascular: Rhythm Regular, No Murmur


Respiratory: No Accessory Muscle Use, No Rales, No Rhonchi, No Wheezing


Gastrointestinal/Abdominal: Soft, No Tenderness


Extremity: Capillary Refill (<2 seconds)


Neurological/Psych: Oriented x3, Normal Speech, Normal Cognition








Assessment & Plan





- Assessment and Plan (Free Text)


Assessment: 





73-year-old female with a history of CAD, hypertension, status post stent, 

hypercholesterolemia, and history of suspected COPD admitted to the hospital 

with acute and elevated blood pressure.


 no significant symptoms.


Labs reviewed


Continue to monitor the blood pressure.


Nephrology evaluation.


DVT and GI prophylaxis.


We will follow the patient





Stress test Tomorrow











Objective





- Vital Signs/Intake and Output


Vital Signs (last 24 hours): 


                                        











Temp Pulse Resp BP Pulse Ox


 


 98.4 F   70   18   157/82 H  97 


 


 04/07/19 16:10  04/07/19 16:10  04/07/19 16:10  04/07/19 16:10  04/07/19 16:10








Intake and Output: 


                                        











 04/07/19 04/08/19





 18:59 06:59


 


Intake Total 350 300


 


Balance 350 300














- Medications


Medications: 


                               Current Medications





Acetaminophen/Codeine Phosphate (Tylenol/Codeine 300 Mg/30 Mg)  1 ea PO Q6 PRN


   PRN Reason: Pain, moderate (4-7)


Amlodipine Besylate (Norvasc)  10 mg PO DAILY [facility]


   Last Admin: 04/07/19 09:18 Dose:  10 mg


Aspirin (Ecotrin)  81 mg PO DAILY [facility]


   Last Admin: 04/07/19 09:18 Dose:  81 mg


Chlorthalidone (Hygroton)  25 mg PO DAILY [facility]


   Last Admin: 04/07/19 09:33 Dose:  25 mg


Clopidogrel Bisulfate (Plavix)  75 mg PO DAILY [facility]


   Last Admin: 04/07/19 09:18 Dose:  75 mg


Enoxaparin Sodium (Lovenox)  40 mg SC DAILY [facility]


   Last Admin: 04/07/19 09:19 Dose:  40 mg


Ergocalciferol (Drisdol 50,000 Intl Units Cap)  1 cap PO QWK [facility]


Famotidine (Pepcid)  20 mg PO HS [facility]


   Last Admin: 04/07/19 21:51 Dose:  20 mg


Fluticasone/Vilanterol (Breo Ellipta 100-25 Mcg Inh)  1 puff INH RQ24 [facility]


Hydralazine HCl (Apresoline)  50 mg PO BID [facility]


   Last Admin: 04/07/19 17:07 Dose:  50 mg


Hydralazine HCl (Apresoline)  25 mg PO Q4 PRN


   PRN Reason: Other


Loratadine (Claritin)  10 mg PO DAILY [facility]


   Stop: 04/12/19 10:01


   Last Admin: 04/07/19 09:19 Dose:  10 mg


Losartan Potassium (Cozaar)  100 mg PO DAILY [facility]


   Last Admin: 04/07/19 09:18 Dose:  100 mg


Metoprolol Succinate (Toprol Xl)  100 mg PO DAILY [facility]


   Last Admin: 04/07/19 09:18 Dose:  100 mg


Rosuvastatin Calcium (Crestor)  20 mg PO HS [facility]


   Last Admin: 04/07/19 21:51 Dose:  20 mg


Tiotropium Bromide (Spiriva Inhalation Handihaler Device)  1 inhaler INH RQD [facility]


Tiotropium Bromide (Spiriva)  18 mcg INH RQ24 [facility]


   Last Admin: 04/07/19 08:27 Dose:  Not Given











- Labs


Labs: 


                                        





                                 04/04/19 19:43 





                                 04/04/19 19:43

## 2019-04-07 NOTE — CP.PCM.PN
Subjective





- Date & Time of Evaluation


Date of Evaluation: 04/07/19


Time of Evaluation: 18:17





- Subjective


Subjective: 





Patient now feeling better.


But the right knee pain still noted.


She is not taking any pain medications.


She has a BM.





Seen by cardiologist.


Possible stress test tomorrow.


On examination:


Vital signs stable.


Chest good air entry regular heart sounds noted


Nontender abdomen no pedal edema





Currently patient is taking 5 different types of medication for the controlling 

the blood pressure.


Underlying secondary hypertension also possible.


We will continue the current treatment.


after discussion with the renal and cardiology and will plan for possible 

discharge tomorrow





Objective





- Vital Signs/Intake and Output


Vital Signs (last 24 hours): 


                                        











Temp Pulse Resp BP Pulse Ox


 


 98.4 F   70   18   157/82 H  97 


 


 04/07/19 16:10  04/07/19 16:10  04/07/19 16:10  04/07/19 16:10  04/07/19 16:10








Intake and Output: 


                                        











 04/07/19 04/07/19





 06:59 18:59


 


Intake Total 10 350


 


Balance 10 350














- Medications


Medications: 


                               Current Medications





Acetaminophen/Codeine Phosphate (Tylenol/Codeine 300 Mg/30 Mg)  1 ea PO Q6 PRN


   PRN Reason: Pain, moderate (4-7)


Amlodipine Besylate (Norvasc)  10 mg PO DAILY [facility]


   Last Admin: 04/07/19 09:18 Dose:  10 mg


Aspirin (Ecotrin)  81 mg PO DAILY [facility]


   Last Admin: 04/07/19 09:18 Dose:  81 mg


Chlorthalidone (Hygroton)  25 mg PO DAILY [facility]


   Last Admin: 04/07/19 09:33 Dose:  25 mg


Clopidogrel Bisulfate (Plavix)  75 mg PO DAILY [facility]


   Last Admin: 04/07/19 09:18 Dose:  75 mg


Enoxaparin Sodium (Lovenox)  40 mg SC DAILY [facility]


   Last Admin: 04/07/19 09:19 Dose:  40 mg


Ergocalciferol (Drisdol 50,000 Intl Units Cap)  1 cap PO QWK [facility]


Famotidine (Pepcid)  20 mg PO HS [facility]


   Last Admin: 04/06/19 21:37 Dose:  20 mg


Fluticasone/Vilanterol (Breo Ellipta 100-25 Mcg Inh)  1 puff INH RQ24 [facility]


Hydralazine HCl (Apresoline)  50 mg PO BID [facility]


   Last Admin: 04/07/19 17:07 Dose:  50 mg


Hydralazine HCl (Apresoline)  25 mg PO Q4 PRN


   PRN Reason: Other


Loratadine (Claritin)  10 mg PO DAILY [facility]


   Stop: 04/12/19 10:01


   Last Admin: 04/07/19 09:19 Dose:  10 mg


Losartan Potassium (Cozaar)  100 mg PO DAILY [facility]


   Last Admin: 04/07/19 09:18 Dose:  100 mg


Metoprolol Succinate (Toprol Xl)  100 mg PO DAILY [facility]


   Last Admin: 04/07/19 09:18 Dose:  100 mg


Rosuvastatin Calcium (Crestor)  20 mg PO HS [facility]


   Last Admin: 04/06/19 21:37 Dose:  20 mg


Tiotropium Bromide (Spiriva Inhalation Handihaler Device)  1 inhaler INH RQD [facility]


Tiotropium Bromide (Spiriva)  18 mcg INH RQ24 [facility]


   Last Admin: 04/07/19 08:27 Dose:  Not Given











- Labs


Labs: 


                                        





                                 04/04/19 19:43 





                                 04/04/19 19:43

## 2019-04-07 NOTE — CP.PCM.PN
Subjective





- Date & Time of Evaluation


Date of Evaluation: 04/07/19


Time of Evaluation: 11:55





- Subjective


Subjective: 





Nephrology Consultation Note:





Assessment: Stable


uncontrolled severe HTN with urgency


CAD s/p stent and hyperlipidemia 


Vit d deficiency


osteoarthritis s/p Rt TKR


LVH


COPD





Plan


No acute need for renal replacement therapy at this time.


Hypertension control with meds as ordered. Maintain hemodynamics stable. Avoid 

hypotension. 


Patient on losartan 100 mg/d. also on toprol XL. resume CCB as norvasc 10 mg/d. 

add thiazide diuretic as chlorthalidone 25 mg/d. in addition will give prn 

hydralazine. hydralazine 50 mg bid


Monitor Input/Output, daily weights and renal function with basic metabolic 

panel


supplement lytes as needed


claritin for few days





Check UA, urine spot protein/creatinine, albumin/creatinine ratio, urine Na


Secondary HTN work up with plasma renin/aldosterone, plasma metanephrine and 

renal artery Doppler to r/o renal artery stenosis, TSH, Vit D level





Dose meds/antibiotics (if needed) for GFR>60


Glycemic control. 


Further work up/management as per primary team





Thanks for allowing me to participate in care of your patient. Will follow 

patient with you. Please call if any Qs. had d/w team


Dr Ryan Lizarraga


Office: [phone] Cell: [phone] Fax: [phone]





Chief Complaint; high BP


Reason for consult: HTN urgency


HPI: Pt is a 73 F with hx of hypertension (40 years) CAD s/p stent, COPD, 

osteoarthritis s/p Rt TKR and hyperlipidemia presented with complaints of BP in 

220 range hence admitted with HTN urgency. 


Denies OTC/herbal meds or NSAIDs


No recent iodinated contrast exposure. 


pt says BP has been high for last few months. has been better controlled prior 

to that 


denies smoking/etoh/drugs/nsaids/decongestants as sudafed/otc meds/licorice


has allergic nasal congestion. she feels in usual health otherwis





ROS: has nasal congestion and sneezing but better with claritin


Cardiovascular: No chest pain. 


Pulmonary: No shortness of breath 


Gastrointestinal: denies abdominal pain No nausea. No vomiting. 


Genitourinary: No pain while urinating. Denies blood in urine. 


All other negative except as mentioned in HPI. 





Physical Examination:      


General Appearance: Comfortable, in no acute respiratory distress, co-operative 

.


Vitals reviewed and noted as below


Head; Atraumatic, normocephalic


ENT: no ulcers no thrush. Tongue is midline. Oropharynx: no rash or ulcers.


EYES: Pupils are equal, round and reactive to light accommodation. Eye muscles 

and extraocular movement intact. Sclera is anicteric.


Neck; supple no lymphadenopathy, no thyromegaly or bruit


Lungs: Normal respiratory rate/effort. Breath sounds bilateral equal and clear


Heart: Normal rate. s1s2 normal. No rub or gallop. 


Extremities: no edema. No varicose veins


Neurological: Patient is alert, awake and oriented to person, place and time. No

focal deficit. Strength bilateral appropriate and equal


Skin: Warm and dry. Normal turgor. No rash. Palpitation: Normal elasticity for 

age


Abdomen: Abdomen is soft. Bowel sounds +. There is no abdominal tenderness, no 

guarding/rigidity no organomegaly


Psych: normal insight and normal affect/mood


MSK: no joint tenderness or swelling. Digits and nails normal, no deformity


: kidney or bladder not palpable





Labs/imaging reviewed.


Past medical history, past surgical history, family history, social history, 

allergy reviewed and noted as below


Family hx: no hx of CKD. Rest non-contributory 





echo: mild concentric LVH


renal imaging 2018 WNL including adrenals








Objective





- Vital Signs/Intake and Output


Vital Signs (last 24 hours): 


                                        











Temp Pulse Resp BP Pulse Ox


 


 98 F   65   18   162/79 H  99 


 


 04/07/19 07:00  04/07/19 07:09  04/07/19 07:00  04/07/19 07:00  04/07/19 07:47








Intake and Output: 


                                        











 04/07/19 04/07/19





 06:59 18:59


 


Intake Total 10 


 


Balance 10 














- Medications


Medications: 


                               Current Medications





Acetaminophen/Codeine Phosphate (Tylenol/Codeine 300 Mg/30 Mg)  1 ea PO Q6 PRN


   PRN Reason: Pain, moderate (4-7)


Amlodipine Besylate (Norvasc)  10 mg PO DAILY [facility]


   Last Admin: 04/07/19 09:18 Dose:  10 mg


Aspirin (Ecotrin)  81 mg PO DAILY [facility]


   Last Admin: 04/07/19 09:18 Dose:  81 mg


Chlorthalidone (Hygroton)  25 mg PO DAILY [facility]


   Last Admin: 04/07/19 09:33 Dose:  25 mg


Clopidogrel Bisulfate (Plavix)  75 mg PO DAILY [facility]


   Last Admin: 04/07/19 09:18 Dose:  75 mg


Enoxaparin Sodium (Lovenox)  40 mg SC DAILY [facility]


   Last Admin: 04/07/19 09:19 Dose:  40 mg


Ergocalciferol (Drisdol 50,000 Intl Units Cap)  1 cap PO QWK [facility]


Famotidine (Pepcid)  20 mg PO HS [facility]


   Last Admin: 04/06/19 21:37 Dose:  20 mg


Fluticasone/Vilanterol (Breo Ellipta 100-25 Mcg Inh)  1 puff INH RQ24 [facility]


Hydralazine HCl (Apresoline)  50 mg PO BID [facility]


   Last Admin: 04/07/19 09:18 Dose:  50 mg


Hydralazine HCl (Apresoline)  25 mg PO Q4 PRN


   PRN Reason: Other


Loratadine (Claritin)  10 mg PO DAILY [facility]


   Stop: 04/12/19 10:01


   Last Admin: 04/07/19 09:19 Dose:  10 mg


Losartan Potassium (Cozaar)  100 mg PO DAILY [facility]


   Last Admin: 04/07/19 09:18 Dose:  100 mg


Metoprolol Succinate (Toprol Xl)  100 mg PO DAILY [facility]


   Last Admin: 04/07/19 09:18 Dose:  100 mg


Rosuvastatin Calcium (Crestor)  20 mg PO HS [facility]


   Last Admin: 04/06/19 21:37 Dose:  20 mg


Tiotropium Bromide (Spiriva Inhalation Handihaler Device)  1 inhaler INH RQD [facility]


Tiotropium Bromide (Spiriva)  18 mcg INH RQ24 [facility]


   Last Admin: 04/07/19 08:27 Dose:  Not Given











- Labs


Labs: 


                                        





                                 04/04/19 19:43 





                                 04/04/19 19:43

## 2019-04-07 NOTE — CP.PCM.PN
Subjective





- Date & Time of Evaluation


Date of Evaluation: 04/06/19


Time of Evaluation: 18:17





- Subjective


Subjective: 





Patient slept well last night.


The blood pressure seems to be improving at this time.


Patient is currently on multiple oral antihypertensives.


No chest pain no shortness of breath





Vital signs are stable


Chest good air entry, regular Hs noted nontender abdomen


Assessment and recommendation:


Patient is a 73-year-old female with a history of hypertension.


Patient has multiple osteoarthritic changes.


Knee joint pain.


Admitted with uncontrolled hypertension with systemic symptoms.


Currently doing well we will continue to monitor.








Objective





- Vital Signs/Intake and Output


Vital Signs (last 24 hours): 


                                        











Temp Pulse Resp BP Pulse Ox


 


 98.4 F   70   18   157/82 H  97 


 


 04/07/19 16:10  04/07/19 16:10  04/07/19 16:10  04/07/19 16:10  04/07/19 16:10








Intake and Output: 


                                        











 04/07/19 04/07/19





 06:59 18:59


 


Intake Total 10 350


 


Balance 10 350














- Medications


Medications: 


                               Current Medications





Acetaminophen/Codeine Phosphate (Tylenol/Codeine 300 Mg/30 Mg)  1 ea PO Q6 PRN


   PRN Reason: Pain, moderate (4-7)


Amlodipine Besylate (Norvasc)  10 mg PO DAILY [facility]


   Last Admin: 04/07/19 09:18 Dose:  10 mg


Aspirin (Ecotrin)  81 mg PO DAILY [facility]


   Last Admin: 04/07/19 09:18 Dose:  81 mg


Chlorthalidone (Hygroton)  25 mg PO DAILY [facility]


   Last Admin: 04/07/19 09:33 Dose:  25 mg


Clopidogrel Bisulfate (Plavix)  75 mg PO DAILY [facility]


   Last Admin: 04/07/19 09:18 Dose:  75 mg


Enoxaparin Sodium (Lovenox)  40 mg SC DAILY [facility]


   Last Admin: 04/07/19 09:19 Dose:  40 mg


Ergocalciferol (Drisdol 50,000 Intl Units Cap)  1 cap PO QWK [facility]


Famotidine (Pepcid)  20 mg PO HS [facility]


   Last Admin: 04/06/19 21:37 Dose:  20 mg


Fluticasone/Vilanterol (Breo Ellipta 100-25 Mcg Inh)  1 puff INH RQ24 [facility]


Hydralazine HCl (Apresoline)  50 mg PO BID [facility]


   Last Admin: 04/07/19 17:07 Dose:  50 mg


Hydralazine HCl (Apresoline)  25 mg PO Q4 PRN


   PRN Reason: Other


Loratadine (Claritin)  10 mg PO DAILY [facility]


   Stop: 04/12/19 10:01


   Last Admin: 04/07/19 09:19 Dose:  10 mg


Losartan Potassium (Cozaar)  100 mg PO DAILY [facility]


   Last Admin: 04/07/19 09:18 Dose:  100 mg


Metoprolol Succinate (Toprol Xl)  100 mg PO DAILY [facility]


   Last Admin: 04/07/19 09:18 Dose:  100 mg


Rosuvastatin Calcium (Crestor)  20 mg PO HS [facility]


   Last Admin: 04/06/19 21:37 Dose:  20 mg


Tiotropium Bromide (Spiriva Inhalation Handihaler Device)  1 inhaler INH RQD [facility]


Tiotropium Bromide (Spiriva)  18 mcg INH RQ24 [facility]


   Last Admin: 04/07/19 08:27 Dose:  Not Given











- Labs


Labs: 


                                        





                                 04/04/19 19:43 





                                 04/04/19 19:43

## 2019-04-07 NOTE — CP.PCM.PN
Subjective





- Date & Time of Evaluation


Date of Evaluation: 04/05/19


Time of Evaluation: 18:17





- Subjective


Subjective: 





Patient is still continues to have very elevated blood pressure.


Renal evaluation is currently pending.


No chest pain no shortness of breath.


Right knee pain noted.





Seen by cardiology also.


Meanwhile we will continue the current treatment.


Close monitoring the blood pressure.


Evaluation with the nephrologist and will follow the patient





Objective





- Vital Signs/Intake and Output


Vital Signs (last 24 hours): 


                                        











Temp Pulse Resp BP Pulse Ox


 


 98.4 F   70   18   157/82 H  97 


 


 04/07/19 16:10  04/07/19 16:10  04/07/19 16:10  04/07/19 16:10  04/07/19 16:10








Intake and Output: 


                                        











 04/07/19 04/07/19





 06:59 18:59


 


Intake Total 10 350


 


Balance 10 350














- Medications


Medications: 


                               Current Medications





Acetaminophen/Codeine Phosphate (Tylenol/Codeine 300 Mg/30 Mg)  1 ea PO Q6 PRN


   PRN Reason: Pain, moderate (4-7)


Amlodipine Besylate (Norvasc)  10 mg PO DAILY [facility]


   Last Admin: 04/07/19 09:18 Dose:  10 mg


Aspirin (Ecotrin)  81 mg PO DAILY [facility]


   Last Admin: 04/07/19 09:18 Dose:  81 mg


Chlorthalidone (Hygroton)  25 mg PO DAILY [facility]


   Last Admin: 04/07/19 09:33 Dose:  25 mg


Clopidogrel Bisulfate (Plavix)  75 mg PO DAILY [facility]


   Last Admin: 04/07/19 09:18 Dose:  75 mg


Enoxaparin Sodium (Lovenox)  40 mg SC DAILY [facility]


   Last Admin: 04/07/19 09:19 Dose:  40 mg


Ergocalciferol (Drisdol 50,000 Intl Units Cap)  1 cap PO QWK [facility]


Famotidine (Pepcid)  20 mg PO HS [facility]


   Last Admin: 04/06/19 21:37 Dose:  20 mg


Fluticasone/Vilanterol (Breo Ellipta 100-25 Mcg Inh)  1 puff INH RQ24 [facility]


Hydralazine HCl (Apresoline)  50 mg PO BID [facility]


   Last Admin: 04/07/19 17:07 Dose:  50 mg


Hydralazine HCl (Apresoline)  25 mg PO Q4 PRN


   PRN Reason: Other


Loratadine (Claritin)  10 mg PO DAILY [facility]


   Stop: 04/12/19 10:01


   Last Admin: 04/07/19 09:19 Dose:  10 mg


Losartan Potassium (Cozaar)  100 mg PO DAILY [facility]


   Last Admin: 04/07/19 09:18 Dose:  100 mg


Metoprolol Succinate (Toprol Xl)  100 mg PO DAILY [facility]


   Last Admin: 04/07/19 09:18 Dose:  100 mg


Rosuvastatin Calcium (Crestor)  20 mg PO [facility]


   Last Admin: 04/06/19 21:37 Dose:  20 mg


Tiotropium Bromide (Spiriva Inhalation Handihaler Device)  1 inhaler INH RQD [facility]


Tiotropium Bromide (Spiriva)  18 mcg INH RQ24 [facility]


   Last Admin: 04/07/19 08:27 Dose:  Not Given











- Labs


Labs: 


                                        





                                 04/04/19 19:43 





                                 04/04/19 19:43

## 2019-04-07 NOTE — CARD
--------------- APPROVED REPORT --------------





Date of service: 04/04/2019



EKG Measurement

Heart [id number]

[place] [id number]

[id number]

[id number]

[id number]



<Conclusion>

Normal sinus rhythm

Possible Left atrial enlargement

T wave abnormality, consider inferolateral ischemia

Abnormal ECG

## 2019-04-07 NOTE — CARD
--------------- APPROVED REPORT --------------





Date of service: 04/06/2019



EXAM: Two-dimensional and M-mode echocardiogram with Doppler and 

color Doppler.



Other Information 

Quality : AverageRhythm : NSR



INDICATION

CAD Chest Pain COPD 



RISK FACTORS

Hypertension 

Hyperlipidemia



2D DIMENSIONS 

IVSd1.2   (0.7-1.1cm)Aortic Root (2D)2.9   (2.0-3.7cm)

LVDd3.8   (3.9-5.9cm)PWd1.2   (0.7-1.1cm)

LA [id number]   (18-58mL)LVDs2.7   (2.5-4.0cm)

FS (%) 27.9   %LVEF (%)54.9   (>50%)



M-Mode DIMENSIONS 

Left Atrium (MM)3.17   (2.5-4.0cm)Aortic Root3.21   (2.2-3.7cm)

Aortic Cusp Exc.1.70   (1.5-2.0cm)



Aortic Valve

AoV Peak Zhfcmznz118.9cm/Wendy Peak GR.6mmHg



Mitral Valve

MV E Cwrybtfj33.9cm/sMV A Yamwkrwh01.3cm/sE/A ratio0.5



TDI

Lateral E' Peak V6.74cm/sMedial E' Peak V5.64cm/sE/Lateral E'7.8

E/Medial E'9.4



Tricuspid Valve

TR Peak Wrsllmck929cu/sTR Peak Gr.67fbSjTOVB71dtZd



<Conclusion>

Left ventricle: thickness: mild concentric thickening; size: normal; 

overall ejection fraction: 55%: 

diastolic filling pressures: normal



Mitral valve: annulus: normal: leaflets: normal: excursion: normal; 

no significant trans-mitral gradient: no significant incompetence: 

left atrium: normal

Aortic valve: leaflets: normal: excursion: normal; no significant 

trans-aortic gradient: No significant incompetence: aortic root: 

normal

Right sided Structures: Pulmonary valve: normal; no significant 

incompetence; Tricuspid valve: normal; no significant incompetence:

Intra-cardiac hemodynamics: pulmonary systolic pressures: normal; 

central venous pressures: normal

No pericardial effusion

## 2019-04-08 LAB
ALBUMIN SERPL-MCNC: 3.7 G/DL (ref 3.5–5)
ALBUMIN/GLOB SERPL: 1.3 {RATIO} (ref 1–2.1)
ALT SERPL-CCNC: 12 U/L (ref 9–52)
AST SERPL-CCNC: 26 U/L (ref 14–36)
BASOPHILS # BLD AUTO: 0 K/UL (ref 0–0.2)
BASOPHILS NFR BLD: 0.9 % (ref 0–2)
BUN SERPL-MCNC: 33 MG/DL (ref 7–17)
CALCIUM SERPL-MCNC: 9.1 MG/DL (ref 8.6–10.4)
EOSINOPHIL # BLD AUTO: 0.1 K/UL (ref 0–0.7)
EOSINOPHIL NFR BLD: 2.6 % (ref 0–4)
ERYTHROCYTE [DISTWIDTH] IN BLOOD BY AUTOMATED COUNT: 15.2 % (ref 11.5–14.5)
GFR NON-AFRICAN AMERICAN: 44
HGB BLD-MCNC: 13.7 G/DL (ref 11–16)
LYMPHOCYTES # BLD AUTO: 1.7 K/UL (ref 1–4.3)
LYMPHOCYTES NFR BLD AUTO: 42.6 % (ref 20–40)
MCH RBC QN AUTO: 27.5 PG (ref 27–31)
MCHC RBC AUTO-ENTMCNC: 32.1 G/DL (ref 33–37)
MCV RBC AUTO: 85.6 FL (ref 81–99)
MONOCYTES # BLD: 0.5 K/UL (ref 0–0.8)
MONOCYTES NFR BLD: 12.2 % (ref 0–10)
NEUTROPHILS # BLD: 1.7 K/UL (ref 1.8–7)
NEUTROPHILS NFR BLD AUTO: 41.7 % (ref 50–75)
NRBC BLD AUTO-RTO: 0.1 % (ref 0–2)
PLATELET # BLD: 178 K/UL (ref 130–400)
PMV BLD AUTO: 9.9 FL (ref 7.2–11.7)
RBC # BLD AUTO: 4.99 MIL/UL (ref 3.8–5.2)
WBC # BLD AUTO: 4 K/UL (ref 4.8–10.8)

## 2019-04-08 RX ADMIN — TIOTROPIUM BROMIDE SCH: 18 CAPSULE ORAL; RESPIRATORY (INHALATION) at 09:33

## 2019-04-08 RX ADMIN — METOPROLOL SUCCINATE SCH MG: 100 TABLET, EXTENDED RELEASE ORAL at 14:39

## 2019-04-08 RX ADMIN — ENOXAPARIN SODIUM SCH MG: 40 INJECTION SUBCUTANEOUS at 14:40

## 2019-04-08 NOTE — CP.PCM.PN
Subjective





- Date & Time of Evaluation


Date of Evaluation: 04/08/19


Time of Evaluation: 18:46





- Subjective


Subjective: 





HOUSE DR NOTE





Nursing paged @ 6:40pm for epigastric pain. Pt found in [address]. Pt states she 

just started eating sandwich and now feels "burning sensation below her chest 

bone" and "bad taste in her mouth." Admits history of GERD. Denies radiation, 

diaphoresis, vomiting.





PE: Gen: NAD


   CV: s1,S2, no murmur/rub/gallop


   Lungs: CTA b/l





PLan:


Mylanta trial


Pt with anti-reflux meds QHS





Zaynab Taylor PGY3





Objective





- Vital Signs/Intake and Output


Vital Signs (last 24 hours): 


                                        











Temp Pulse Resp BP Pulse Ox


 


 97.4 F L  82   20   138/70   98 


 


 04/08/19 18:36  04/08/19 18:36  04/08/19 18:36  04/08/19 18:36  04/08/19 18:36








Intake and Output: 


                                        











 04/08/19 04/08/19





 06:59 18:59


 


Intake Total 300 


 


Balance 300 














- Medications


Medications: 


                               Current Medications





Acetaminophen/Codeine Phosphate (Tylenol/Codeine 300 Mg/30 Mg)  1 ea PO Q6 PRN


   PRN Reason: Pain, moderate (4-7)


Amlodipine Besylate (Norvasc)  10 mg PO DAILY [facility]


   Last Admin: 04/08/19 14:39 Dose:  10 mg


Aspirin (Ecotrin)  81 mg PO DAILY [facility]


   Last Admin: 04/08/19 14:38 Dose:  81 mg


Chlorthalidone (Hygroton)  25 mg PO DAILY [facility]


   Last Admin: 04/08/19 14:40 Dose:  Not Given


Clopidogrel Bisulfate (Plavix)  75 mg PO DAILY [facility]


   Last Admin: 04/08/19 14:39 Dose:  75 mg


Enoxaparin Sodium (Lovenox)  40 mg SC DAILY [facility]


   Last Admin: 04/08/19 14:40 Dose:  40 mg


Ergocalciferol (Drisdol 50,000 Intl Units Cap)  1 cap PO QWK [facility]


Famotidine (Pepcid)  20 mg PO HS [facility]


   Last Admin: 04/07/19 21:51 Dose:  20 mg


Fluticasone/Vilanterol (Breo Ellipta 100-25 Mcg Inh)  1 puff INH RQ24 [facility]


Hydralazine HCl (Apresoline)  25 mg PO Q4 PRN


   PRN Reason: Other


Hydralazine HCl (Apresoline)  100 mg PO BID [facility]


   Last Admin: 04/08/19 17:45 Dose:  100 mg


Loratadine (Claritin)  10 mg PO DAILY [facility]


   Stop: 04/12/19 10:01


   Last Admin: 04/08/19 14:38 Dose:  10 mg


Losartan Potassium (Cozaar)  100 mg PO DAILY [facility]


   Last Admin: 04/08/19 14:39 Dose:  100 mg


Metoprolol Succinate (Toprol Xl)  100 mg PO DAILY [facility]


   Last Admin: 04/08/19 14:39 Dose:  100 mg


Rosuvastatin Calcium (Crestor)  20 mg PO HS [facility]


   Last Admin: 04/07/19 21:51 Dose:  20 mg


Tiotropium Bromide (Spiriva Inhalation Handihaler Device)  1 inhaler INH RQD [facility]


Tiotropium Bromide (Spiriva)  18 mcg INH RQ24 [facility]


   Last Admin: 04/08/19 09:33 Dose:  Not Given











- Labs


Labs: 


                                        





                                 04/08/19 06:02 





                                 04/08/19 06:02

## 2019-04-08 NOTE — CP.PCM.PN
Subjective





- Date & Time of Evaluation


Date of Evaluation: 04/08/19


Time of Evaluation: 19:30





- Subjective


Subjective: 








Patient seen and evaluated


Denies chest pain and dyspnea


BP stable





Patient s/p stress test and ECHO 





 Review Of Systems 


Eyes: Positive for: Vision Change (blurry vision with blinking from eye i

rritation)


Cardiovascular: Negative for: Chest Pain, Palpitations


Respiratory: Negative for: Shortness of Breath


Musculoskeletal: Positive for: Other (right knee pain )


Neurological: Negative for: Headache, Dizziness





 Physical Exam 





- Physical Exam


Appears: Non-toxic, Other (hypertensive)


Skin: Normal Color, Warm, Dry


Head: Atraumatic, Normacephalic


Eye(s): bilateral: Normal Inspection, PERRL


Neck: Normal ROM, Supple


Chest: Symmetrical, No Deformity


Cardiovascular: Rhythm Regular, No Murmur


Respiratory: No Accessory Muscle Use, No Rales, No Rhonchi, No Wheezing


Gastrointestinal/Abdominal: Soft, No Tenderness


Extremity: Capillary Refill (<2 seconds)


Neurological/Psych: Oriented x3, Normal Speech, Normal Cognition








Assessment & Plan





- Assessment and Plan (Free Text)


Assessment: 





73-year-old female with a history of CAD, hypertension, status post stent, 

hypercholesterolemia, and history of suspected COPD admitted to the hospital 

with acute and elevated blood pressure.


 no significant symptoms.


Labs reviewed


Continue to monitor the blood pressure.


Nephrology evaluation.


DVT and GI prophylaxis.





Normal stress test 


ECHO: Normal EF








Objective





- Vital Signs/Intake and Output


Vital Signs (last 24 hours): 


                                        











Temp Pulse Resp BP Pulse Ox


 


 97.4 F L  82   20   138/70   98 


 


 04/08/19 18:36  04/08/19 18:36  04/08/19 18:36  04/08/19 18:36  04/08/19 18:36











- Medications


Medications: 


                               Current Medications





Acetaminophen/Codeine Phosphate (Tylenol/Codeine 300 Mg/30 Mg)  1 ea PO Q6 PRN


   PRN Reason: Pain, moderate (4-7)


Amlodipine Besylate (Norvasc)  10 mg PO DAILY [facility]


   Last Admin: 04/08/19 14:39 Dose:  10 mg


Aspirin (Ecotrin)  81 mg PO DAILY [facility]


   Last Admin: 04/08/19 14:38 Dose:  81 mg


Chlorthalidone (Hygroton)  25 mg PO DAILY [facility]


   Last Admin: 04/08/19 14:40 Dose:  Not Given


Clopidogrel Bisulfate (Plavix)  75 mg PO DAILY [facility]


   Last Admin: 04/08/19 14:39 Dose:  75 mg


Enoxaparin Sodium (Lovenox)  40 mg SC DAILY [facility]


   Last Admin: 04/08/19 14:40 Dose:  40 mg


Ergocalciferol (Drisdol 50,000 Intl Units Cap)  1 cap PO QWK [facility]


Famotidine (Pepcid)  20 mg PO HS [facility]


   Last Admin: 04/08/19 21:29 Dose:  20 mg


Fluticasone/Vilanterol (Breo Ellipta 100-25 Mcg Inh)  1 puff INH RQ24 [facility]


Hydralazine HCl (Apresoline)  25 mg PO Q4 PRN


   PRN Reason: Other


Hydralazine HCl (Apresoline)  100 mg PO BID [facility]


   Last Admin: 04/08/19 17:45 Dose:  100 mg


Loratadine (Claritin)  10 mg PO DAILY [facility]


   Stop: 04/12/19 10:01


   Last Admin: 04/08/19 14:38 Dose:  10 mg


Losartan Potassium (Cozaar)  100 mg PO DAILY [facility]


   Last Admin: 04/08/19 14:39 Dose:  100 mg


Metoprolol Succinate (Toprol Xl)  100 mg PO DAILY [facility]


   Last Admin: 04/08/19 14:39 Dose:  100 mg


Rosuvastatin Calcium (Crestor)  20 mg PO HS [facility]


   Last Admin: 04/08/19 21:29 Dose:  20 mg


Tiotropium Bromide (Spiriva Inhalation Handihaler Device)  1 inhaler INH RQD [facility]


Tiotropium Bromide (Spiriva)  18 mcg INH RQ24 [facility]


   Last Admin: 04/08/19 09:33 Dose:  Not Given











- Labs


Labs: 


                                        





                                 04/08/19 06:02 





                                 04/08/19 06:02

## 2019-04-08 NOTE — CP.PCM.PN
Subjective





- Date & Time of Evaluation


Date of Evaluation: 04/08/19


Time of Evaluation: 16:07





- Subjective


Subjective: 





Nephrology Consultation Note:





Assessment: Stable


uncontrolled severe HTN with urgency


CAD s/p stent and hyperlipidemia 


Vit d deficiency


osteoarthritis s/p Rt TKR


LVH


COPD





Plan


No acute need for renal replacement therapy at this time.


Hypertension control with meds as ordered. Maintain hemodynamics stable. Avoid 

hypotension. 


Patient on losartan 100 mg/d. also on toprol XL. resume CCB as norvasc 10 mg/d. 

add thiazide diuretic as chlorthalidone 25 mg/d. in addition will give prn 

hydralazine. hydralazine 100 mg bid


Monitor Input/Output, daily weights and renal function with basic metabolic 

panel


supplement lytes as needed


claritin for few days





Check UA, urine spot protein/creatinine, albumin/creatinine ratio, urine Na


Secondary HTN work up with plasma renin/aldosterone, plasma metanephrine and 

renal artery Doppler to r/o renal artery stenosis, TSH, Vit D level





Dose meds/antibiotics (if needed) for GFR>60


Glycemic control. 


Further work up/management as per primary team


pt stable for d/c from renal perspective when planned with 1 week outpt follow 

up





Thanks for allowing me to participate in care of your patient. Will follow 

patient with you. Please call if any Qs. had d/w team


Dr Ryan Lizarraga


Office: [phone] Cell: [phone] Fax: [phone]





Chief Complaint; high BP


Reason for consult: HTN urgency


HPI: Pt is a 73 F with hx of hypertension (40 years) CAD s/p stent, COPD, 

osteoarthritis s/p Rt TKR and hyperlipidemia presented with complaints of BP in 

220 range hence admitted with HTN urgency. 


Denies OTC/herbal meds or NSAIDs


No recent iodinated contrast exposure. 


pt says BP has been high for last few months. has been better controlled prior 

to that 


denies smoking/etoh/drugs/nsaids/decongestants as sudafed/otc meds/licorice


has allergic nasal congestion. she feels in usual health otherwis





ROS: has nasal congestion and sneezing but better with claritin


Cardiovascular: No chest pain. 


Pulmonary: No shortness of breath 


Gastrointestinal: denies abdominal pain No nausea. No vomiting. 


Genitourinary: No pain while urinating. Denies blood in urine. 


All other negative except as mentioned in HPI. 





Physical Examination:      


General Appearance: Comfortable, in no acute respiratory distress, co-operative 

.


Vitals reviewed and noted as below


Head; Atraumatic, normocephalic


ENT: no ulcers no thrush. Tongue is midline. Oropharynx: no rash or ulcers.


EYES: Pupils are equal, round and reactive to light accommodation. Eye muscles 

and extraocular movement intact. Sclera is anicteric.


Neck; supple no lymphadenopathy, no thyromegaly or bruit


Lungs: Normal respiratory rate/effort. Breath sounds bilateral equal and clear


Heart: Normal rate. s1s2 normal. No rub or gallop. 


Extremities: no edema. No varicose veins


Neurological: Patient is alert, awake and oriented to person, place and time. No

focal deficit. Strength bilateral appropriate and equal


Skin: Warm and dry. Normal turgor. No rash. Palpitation: Normal elasticity for 

age


Abdomen: Abdomen is soft. Bowel sounds +. There is no abdominal tenderness, no 

guarding/rigidity no organomegaly


Psych: normal insight and normal affect/mood


MSK: no joint tenderness or swelling. Digits and nails normal, no deformity


: kidney or bladder not palpable





Labs/imaging reviewed.


Past medical history, past surgical history, family history, social history, 

allergy reviewed and noted as below


Family hx: no hx of CKD. Rest non-contributory 





echo: mild concentric LVH


renal imaging 2018 WNL including adrenals





Objective





- Vital Signs/Intake and Output


Vital Signs (last 24 hours): 


                                        











Temp Pulse Resp BP Pulse Ox


 


 98.1 F   68   18   168/84 H  98 


 


 04/08/19 07:00  04/08/19 07:00  04/08/19 07:00  04/08/19 07:00  04/08/19 07:00








Intake and Output: 


                                        











 04/08/19 04/08/19





 06:59 18:59


 


Intake Total 300 


 


Balance 300 














- Medications


Medications: 


                               Current Medications





Acetaminophen/Codeine Phosphate (Tylenol/Codeine 300 Mg/30 Mg)  1 ea PO Q6 PRN


   PRN Reason: Pain, moderate (4-7)


Amlodipine Besylate (Norvasc)  10 mg PO DAILY [facility]


   Last Admin: 04/08/19 14:39 Dose:  10 mg


Aspirin (Ecotrin)  81 mg PO DAILY [facility]


   Last Admin: 04/08/19 14:38 Dose:  81 mg


Chlorthalidone (Hygroton)  25 mg PO DAILY [facility]


   Last Admin: 04/08/19 14:40 Dose:  Not Given


Clopidogrel Bisulfate (Plavix)  75 mg PO DAILY [facility]


   Last Admin: 04/08/19 14:39 Dose:  75 mg


Enoxaparin Sodium (Lovenox)  40 mg SC DAILY [facility]


   Last Admin: 04/08/19 14:40 Dose:  40 mg


Ergocalciferol (Drisdol 50,000 Intl Units Cap)  1 cap PO QWK [facility]


Famotidine (Pepcid)  20 mg PO HS [facility]


   Last Admin: 04/07/19 21:51 Dose:  20 mg


Fluticasone/Vilanterol (Breo Ellipta 100-25 Mcg Inh)  1 puff INH RQ24 [facility]


Hydralazine HCl (Apresoline)  25 mg PO Q4 PRN


   PRN Reason: Other


Hydralazine HCl (Apresoline)  100 mg PO BID [facility]


Loratadine (Claritin)  10 mg PO DAILY [facility]


   Stop: 04/12/19 10:01


   Last Admin: 04/08/19 14:38 Dose:  10 mg


Losartan Potassium (Cozaar)  100 mg PO DAILY [facility]


   Last Admin: 04/08/19 14:39 Dose:  100 mg


Metoprolol Succinate (Toprol Xl)  100 mg PO DAILY [facility]


   Last Admin: 04/08/19 14:39 Dose:  100 mg


Rosuvastatin Calcium (Crestor)  20 mg PO HS [facility]


   Last Admin: 04/07/19 21:51 Dose:  20 mg


Tiotropium Bromide (Spiriva Inhalation Handihaler Device)  1 inhaler INH RQD [facility]


Tiotropium Bromide (Spiriva)  18 mcg INH RQ24 [facility]


   Last Admin: 04/08/19 09:33 Dose:  Not Given











- Labs


Labs: 


                                        





                                 04/08/19 06:02 





                                 04/08/19 06:02

## 2019-04-09 RX ADMIN — ENOXAPARIN SODIUM SCH MG: 40 INJECTION SUBCUTANEOUS at 10:58

## 2019-04-09 RX ADMIN — METOPROLOL SUCCINATE SCH MG: 100 TABLET, EXTENDED RELEASE ORAL at 10:56

## 2019-04-09 NOTE — RAD
Date of service: 



04/09/2019



HISTORY:

 fever 



COMPARISON:

 3/6/2018 at 10:20 hours



TECHNIQUE:

Chest PA and lateral views



FINDINGS:



LUNGS:

Lung volumes are increased since prior exam.  Based on current exam 

COPD is suspect.



Left apical granuloma and/or calcified pleural plaque unchanged. 



PLEURA:

No significant pleural effusion identified. No pneumothorax apparent.



CARDIOVASCULAR:

No aortic atherosclerotic calcification present.  Possible left 

coronary artery calcifications. 



Normal cardiac size. No pulmonary vascular congestion. 



OSSEOUS STRUCTURES:

Mild thoraco lumbar spondylosis.  Leftward thoraco lumbar convexity.



VISUALIZED UPPER ABDOMEN:

Normal.



OTHER FINDINGS:

None.



IMPRESSION:

No interval pathology noted.  Other findings as above.

## 2019-04-09 NOTE — CP.PCM.PN
Subjective





- Date & Time of Evaluation


Date of Evaluation: 04/09/19


Time of Evaluation: 22:35





Objective





- Vital Signs/Intake and Output


Vital Signs (last 24 hours): 


                                        











Temp Pulse Resp BP Pulse Ox


 


 100.3 F H  76   18   136/72   95 


 


 04/09/19 15:32  04/09/19 15:32  04/09/19 15:32  04/09/19 15:32  04/09/19 15:32











- Medications


Medications: 


                               Current Medications





Acetaminophen/Codeine Phosphate (Tylenol/Codeine 300 Mg/30 Mg)  1 ea PO Q6 PRN


   PRN Reason: Pain, moderate (4-7)


   Last Admin: 04/09/19 21:20 Dose:  1 ea


Amlodipine Besylate (Norvasc)  10 mg PO DAILY [facility]


   Last Admin: 04/09/19 10:56 Dose:  10 mg


Aspirin (Ecotrin)  81 mg PO DAILY [facility]


   Last Admin: 04/09/19 10:55 Dose:  81 mg


Azithromycin (Zithromax)  250 mg PO DAILY [facility]; Protocol


   Last Admin: 04/09/19 10:56 Dose:  250 mg


Chlorthalidone (Hygroton)  25 mg PO DAILY [facility]


   Last Admin: 04/09/19 10:55 Dose:  25 mg


Clopidogrel Bisulfate (Plavix)  75 mg PO DAILY [facility]


   Last Admin: 04/09/19 10:56 Dose:  75 mg


Enoxaparin Sodium (Lovenox)  40 mg SC DAILY [facility]


   Last Admin: 04/09/19 10:58 Dose:  40 mg


Ergocalciferol (Drisdol 50,000 Intl Units Cap)  1 cap PO QWK [facility]


Famotidine (Pepcid)  20 mg PO HS [facility]


   Last Admin: 04/09/19 21:20 Dose:  20 mg


Fluticasone/Vilanterol (Breo Ellipta 100-25 Mcg Inh)  1 puff INH RQ24 [facility]


Hydralazine HCl (Apresoline)  25 mg PO Q4 PRN


   PRN Reason: Other


Hydralazine HCl (Apresoline)  100 mg PO BID [facility]


   Last Admin: 04/09/19 17:27 Dose:  100 mg


Loratadine (Claritin)  10 mg PO DAILY [facility]


   Stop: 04/12/19 10:01


   Last Admin: 04/09/19 10:58 Dose:  10 mg


Losartan Potassium (Cozaar)  100 mg PO DAILY [facility]


   Last Admin: 04/09/19 10:55 Dose:  100 mg


Metoprolol Succinate (Toprol Xl)  100 mg PO DAILY [facility]


   Last Admin: 04/09/19 10:56 Dose:  100 mg


Rosuvastatin Calcium (Crestor)  20 mg PO HS [facility]


   Last Admin: 04/09/19 21:20 Dose:  20 mg


Tiotropium Bromide (Spiriva Inhalation Handihaler Device)  1 inhaler INH RQD [facility]


Tiotropium Bromide (Spiriva)  18 mcg INH RQ24 [facility]


   Last Admin: 04/08/19 09:33 Dose:  Not Given











- Labs


Labs: 


                                        





                                 04/08/19 06:02 





                                 04/08/19 06:02

## 2019-04-09 NOTE — CP.PCM.PN
Subjective





- Date & Time of Evaluation


Date of Evaluation: 04/09/19


Time of Evaluation: 22:31





Objective





- Vital Signs/Intake and Output


Vital Signs (last 24 hours): 


                                        











Temp Pulse Resp BP Pulse Ox


 


 100.3 F H  76   18   136/72   95 


 


 04/09/19 15:32  04/09/19 15:32  04/09/19 15:32  04/09/19 15:32  04/09/19 15:32











- Medications


Medications: 


                               Current Medications





Acetaminophen/Codeine Phosphate (Tylenol/Codeine 300 Mg/30 Mg)  1 ea PO Q6 PRN


   PRN Reason: Pain, moderate (4-7)


   Last Admin: 04/09/19 21:20 Dose:  1 ea


Amlodipine Besylate (Norvasc)  10 mg PO DAILY [facility]


   Last Admin: 04/09/19 10:56 Dose:  10 mg


Aspirin (Ecotrin)  81 mg PO DAILY [facility]


   Last Admin: 04/09/19 10:55 Dose:  81 mg


Azithromycin (Zithromax)  250 mg PO DAILY [facility]; Protocol


   Last Admin: 04/09/19 10:56 Dose:  250 mg


Chlorthalidone (Hygroton)  25 mg PO DAILY [facility]


   Last Admin: 04/09/19 10:55 Dose:  25 mg


Clopidogrel Bisulfate (Plavix)  75 mg PO DAILY [facility]


   Last Admin: 04/09/19 10:56 Dose:  75 mg


Enoxaparin Sodium (Lovenox)  40 mg SC DAILY [facility]


   Last Admin: 04/09/19 10:58 Dose:  40 mg


Ergocalciferol (Drisdol 50,000 Intl Units Cap)  1 cap PO QWK [facility]


Famotidine (Pepcid)  20 mg PO HS [facility]


   Last Admin: 04/09/19 21:20 Dose:  20 mg


Fluticasone/Vilanterol (Breo Ellipta 100-25 Mcg Inh)  1 puff INH RQ24 [facility]


Hydralazine HCl (Apresoline)  25 mg PO Q4 PRN


   PRN Reason: Other


Hydralazine HCl (Apresoline)  100 mg PO BID [facility]


   Last Admin: 04/09/19 17:27 Dose:  100 mg


Loratadine (Claritin)  10 mg PO DAILY [facility]


   Stop: 04/12/19 10:01


   Last Admin: 04/09/19 10:58 Dose:  10 mg


Losartan Potassium (Cozaar)  100 mg PO DAILY [facility]


   Last Admin: 04/09/19 10:55 Dose:  100 mg


Metoprolol Succinate (Toprol Xl)  100 mg PO DAILY [facility]


   Last Admin: 04/09/19 10:56 Dose:  100 mg


Rosuvastatin Calcium (Crestor)  20 mg PO HS [facility]


   Last Admin: 04/09/19 21:20 Dose:  20 mg


Tiotropium Bromide (Spiriva Inhalation Handihaler Device)  1 inhaler INH RQD [facility]


Tiotropium Bromide (Spiriva)  18 mcg INH RQ24 [facility]


   Last Admin: 04/08/19 09:33 Dose:  Not Given











- Labs


Labs: 


                                        





                                 04/08/19 06:02 





                                 04/08/19 06:02

## 2019-04-09 NOTE — CP.PCM.PN
Subjective





- Date & Time of Evaluation


Date of Evaluation: 04/09/19


Time of Evaluation: 11:15





- Subjective


Subjective: 





Nephrology Consultation Note:





Assessment: Stable


uncontrolled severe HTN with urgency


CAD s/p stent and hyperlipidemia 


Vit d deficiency


osteoarthritis s/p Rt TKR


LVH


COPD





Plan


No acute need for renal replacement therapy at this time.


Hypertension control with meds as ordered. Maintain hemodynamics stable. Avoid 

hypotension. 


Patient on losartan 100 mg/d. also on toprol XL. resume CCB as norvasc 10 mg/d. 

add thiazide diuretic as chlorthalidone 25 mg/d. in addition will give prn 

hydralazine. hydralazine 100 mg bid


Monitor Input/Output, daily weights and renal function with basic metabolic 

panel


supplement lytes as needed


claritin for few days





Check UA, urine spot protein/creatinine, albumin/creatinine ratio, urine Na


Secondary HTN work up with plasma renin/aldosterone, plasma metanephrine and 

renal artery Doppler to r/o renal artery stenosis, TSH, Vit D level





Dose meds/antibiotics (if needed) for GFR>60


Glycemic control. 


Further work up/management as per primary team





Thanks for allowing me to participate in care of your patient. Will follow 

patient with you. Please call if any Qs. had d/w team


Dr Ryan Lizarraga


Office: [phone] Cell: [phone] Fax: [phone]





Chief Complaint; high BP


Reason for consult: HTN urgency


HPI: Pt is a 73 F with hx of hypertension (40 years) CAD s/p stent, COPD, 

osteoarthritis s/p Rt TKR and hyperlipidemia presented with complaints of BP in 

220 range hence admitted with HTN urgency. 


Denies OTC/herbal meds or NSAIDs


No recent iodinated contrast exposure. 


pt says BP has been high for last few months. has been better controlled prior 

to that 


denies smoking/etoh/drugs/nsaids/decongestants as sudafed/otc meds/licorice


has allergic nasal congestion. she feels in usual health otherwis





ROS: has nasal congestion and sneezing but better with claritin. febrile 

yesterday. today better


Cardiovascular: No chest pain. 


Pulmonary: No shortness of breath 


Gastrointestinal: denies abdominal pain No nausea. No vomiting. 


Genitourinary: No pain while urinating. Denies blood in urine. 


All other negative except as mentioned in HPI. 





Physical Examination:      


General Appearance: Comfortable, in no acute respiratory distress, co-operative 

.


Vitals reviewed and noted as below


Head; Atraumatic, normocephalic


ENT: no ulcers no thrush. Tongue is midline. Oropharynx: no rash or ulcers.


EYES: Pupils are equal, round and reactive to light accommodation. Eye muscles 

and extraocular movement intact. Sclera is anicteric.


Neck; supple no lymphadenopathy, no thyromegaly or bruit


Lungs: Normal respiratory rate/effort. Breath sounds bilateral equal and clear


Heart: Normal rate. s1s2 normal. No rub or gallop. 


Extremities: no edema. No varicose veins


Neurological: Patient is alert, awake and oriented to person, place and time. No

focal deficit. Strength bilateral appropriate and equal


Skin: Warm and dry. Normal turgor. No rash. Palpitation: Normal elasticity for 

age


Abdomen: Abdomen is soft. Bowel sounds +. There is no abdominal tenderness, no 

guarding/rigidity no organomegaly


Psych: normal insight and normal affect/mood


MSK: no joint tenderness or swelling. Digits and nails normal, no deformity


: kidney or bladder not palpable





Labs/imaging reviewed.


Past medical history, past surgical history, family history, social history, 

allergy reviewed and noted as below


Family hx: no hx of CKD. Rest non-contributory 





echo: mild concentric LVH


renal imaging 2018 WNL including adrenals








Objective





- Vital Signs/Intake and Output


Vital Signs (last 24 hours): 


                                        











Temp Pulse Resp BP Pulse Ox


 


 98.8 F   79   18   134/73   96 


 


 04/09/19 07:00  04/09/19 07:00  04/09/19 07:00  04/09/19 07:00  04/09/19 07:00








Intake and Output: 


                                        











 04/09/19 04/09/19





 06:59 18:59


 


Intake Total 320 


 


Balance 320 














- Medications


Medications: 


                               Current Medications





Acetaminophen/Codeine Phosphate (Tylenol/Codeine 300 Mg/30 Mg)  1 ea PO Q6 PRN


   PRN Reason: Pain, moderate (4-7)


Amlodipine Besylate (Norvasc)  10 mg PO DAILY [facility]


   Last Admin: 04/09/19 10:56 Dose:  10 mg


Aspirin (Ecotrin)  81 mg PO DAILY [facility]


   Last Admin: 04/09/19 10:55 Dose:  81 mg


Azithromycin (Zithromax)  250 mg PO DAILY [facility]; Protocol


   Last Admin: 04/09/19 10:56 Dose:  250 mg


Chlorthalidone (Hygroton)  25 mg PO DAILY [facility]


   Last Admin: 04/09/19 10:55 Dose:  25 mg


Clopidogrel Bisulfate (Plavix)  75 mg PO DAILY [facility]


   Last Admin: 04/09/19 10:56 Dose:  75 mg


Enoxaparin Sodium (Lovenox)  40 mg SC DAILY [facility]


   Last Admin: 04/09/19 10:58 Dose:  40 mg


Ergocalciferol (Drisdol 50,000 Intl Units Cap)  1 cap PO QWK [facility]


Famotidine (Pepcid)  20 mg PO HS [facility]


   Last Admin: 04/08/19 21:29 Dose:  20 mg


Fluticasone/Vilanterol (Breo Ellipta 100-25 Mcg Inh)  1 puff INH RQ24 [facility]


Hydralazine HCl (Apresoline)  25 mg PO Q4 PRN


   PRN Reason: Other


Hydralazine HCl (Apresoline)  100 mg PO BID [facility]


   Last Admin: 04/09/19 10:57 Dose:  100 mg


Loratadine (Claritin)  10 mg PO DAILY [facility]


   Stop: 04/12/19 10:01


   Last Admin: 04/09/19 10:58 Dose:  10 mg


Losartan Potassium (Cozaar)  100 mg PO DAILY [facility]


   Last Admin: 04/09/19 10:55 Dose:  100 mg


Metoprolol Succinate (Toprol Xl)  100 mg PO DAILY [facility]


   Last Admin: 04/09/19 10:56 Dose:  100 mg


Rosuvastatin Calcium (Crestor)  20 mg PO HS [facility]


   Last Admin: 04/08/19 21:29 Dose:  20 mg


Tiotropium Bromide (Spiriva Inhalation Handihaler Device)  1 inhaler INH RQD [facility]


Tiotropium Bromide (Spiriva)  18 mcg INH RQ24 [facility]


   Last Admin: 04/08/19 09:33 Dose:  Not Given











- Labs


Labs: 


                                        





                                 04/08/19 06:02 





                                 04/08/19 06:02

## 2019-04-09 NOTE — VASCLAB
Date of service: 



04/09/2019



PROCEDURE:  Ultrasonography renal arterial evaluation 



HISTORY:



COMPARISON:

None available. 



TECHNIQUE:

Real-time ultrasonography evaluation of the renal arteries were 

performed. Comparison is made to the aorta. 



Report prepared by   EILEEN Zeng, RVT



FINDINGS:



AORTA:  Patent. Peak systolic velocity 123 centimeters/second 



RIGHT RENAL ARTERY:  Renal artery to aorta ratio: 1.2



* Proximal segment: Patent. Peak systolic velocity 114 

centimeters/second 



* Mid segment:         Patent. Peak systolic velocity 118 

centimeters/second 



* Distal segment:     Patent. Peak systolic velocity 142 

centimeters/second 



Other findings: Right Kidney measures approximately 10.99 

centimeters. 



LEFT RENAL ARTERY:  Renal artery to aorta ratio: 0.7



* Proximal segment: Patent. Peak systolic velocity 81 

centimeters/second 



* Mid segment:         Patent. Peak systolic velocity 56 

centimeters/second 



* Distal segment:     Patent. Peak systolic velocity 55 

centimeters/second 



Other findings: Left Kidney measures approximately 9.63 centimeters. 



IMPRESSION:

No definite hemodynamically significant stenosis involving the renal 

arteries as visualized.

## 2019-04-09 NOTE — CARD
--------------- APPROVED REPORT --------------





Date of service: 04/08/2019



Protocol: LEXISCAN

Test Type: LEXISCAN STRESS

Test Indications: HTN

Medical History: CP

Target HR: 148 bpm

Resting ECG: NSR WITH T WAVE INVERSION V4-V6

Resting Heart Rate: 77 bpm

Resting Blood Pressure: 160/80mmHg

submaximum (85%): 126 bpm



TEST SUMMARY

[id number].

INFUSIONDOSE [id number]/.1.

[id number].



PROCEDURE

Pharmacologic stress testing was performed using 0.4mg per 5ml of 

regadenoson given intravenously over 7-10 seconds.



POST EXERCISE

Reason for Termination: COMPLETED

Protocol Completed

Target HR: No

Max HR: 67 bpm

68% of Maximum Predicted HR: 148 bpm

Exercise duration: 00:30 min:sec, 0 Stage

Exercise capacity: 1.0METs

Max Blood Pressure: 160/80mmHg

Blood Pressure response to exercise: normal resting BP - appropriate 

response

Heart Rate response to exercise: appropriate

Chest Pain: No, none

Angina index: 0

Arrhythmia: No, none

ST Change: No, none

Deviation: 0 mm



INTERPRETATION

Stress EKG Conclusion: NEGATIVE LEXISCAN STRESS TEST



NORMAL BP RESPONSE TO LEXISCAN



NUCLEAR STUDIES TO BE READ SEPARATELY



EXAM: Myocardial Perfusion STRESS/REST



Imaging Protocol

The imaging protocol used to acquire images was Stress Tc-99m/rest 

Tc-99m 1 day

Stress Spect myocardial perfusion imaging was performed in supine 

position 41 minutes following the injection of 13.1 mCi of Tc-99 

Myoview.

Gated Rest Spect was performed 45 minutes after intravenous 32.4 mCi 

Tc-99 Myoview injection.

The images were gated to evaluate regional wall motion and calculate 

ventricular ejection fraction.Images were reconstructed using 

backfilter projection method in short horizontal and verticle long 

axis. Spect slices were generated.



RESTING DATA

EDV80.19mbFX9.60L/min

ESV24.00mlMyocardial Fesv782.00g

Av. Heart Rate83.00bpm

EF70.00%



STRESS DATA

EDV88.03bkFB8.70L/min

ESV27.00mlMyocardial Cevp844.00g

EF69.00%

Regional WT score at stress:3.00

Regional WM score at stress:0.00

Summed WT score at stress:21.00

Av. Heart Rate78.00bpmSummed WM score at stress:3.00



LV Perf. Quant

17 Seg. SSS1.00

17 Seg. SRS0.00

17 Seg. SDS1.00

Stress Defect Extent (% LAD)0.00Rest Defect Extent (% 

LAD)0.00Rev. Defect Extent (% LAD)0.00

Stress Defect Extent (% LCX)0.00Rest Defect Extent (% 

LCX)0.00Rev. Defect Extent (% LCX)0.00

Stress Defect Extent (% RCA)0.00Rest Defect Extent (% 

RCA)0.00Rev. Defect Extent (% RCA)0.00

Stress Defect Extent (% JOSE LUIS)0.00Rest Defect Extent (% 

JOSE LUIS)0.00Rev. Defect Extent (% JOSE LUIS)0.00



IMPRESSION

Normal Myocardial Perfusion exercise stress study



Left Ventricle

LV Function:Left ventricle systolic function is normal.

The Ejection Fraction is 65-70%.

Regional Wall Motion:No regional wall motion abnormalities noted.



Metabolism/Perfusion

There are no defects.

There are no perfusion/metabolism defects.



Conclusion

1. There is no stress-induced ischemia noted.

2. Left ventricle systolic function is normal.

3. The Ejection Fraction is 65-70%.

## 2019-04-10 LAB
ALBUMIN SERPL-MCNC: 3.3 G/DL (ref 3.5–5)
ALBUMIN/GLOB SERPL: 1.3 {RATIO} (ref 1–2.1)
ALDOST/RENIN PLAS-RTO: 23.8 RATIO (ref 0.9–28.9)
ALT SERPL-CCNC: < 6 U/L (ref 9–52)
AST SERPL-CCNC: 23 U/L (ref 14–36)
BASOPHILS # BLD AUTO: 0 K/UL (ref 0–0.2)
BASOPHILS NFR BLD: 0.8 % (ref 0–2)
BILIRUB UR-MCNC: NEGATIVE MG/DL
BUN SERPL-MCNC: 32 MG/DL (ref 7–17)
CALCIUM SERPL-MCNC: 8.7 MG/DL (ref 8.6–10.4)
EOSINOPHIL # BLD AUTO: 0.1 K/UL (ref 0–0.7)
EOSINOPHIL NFR BLD: 1.4 % (ref 0–4)
ERYTHROCYTE [DISTWIDTH] IN BLOOD BY AUTOMATED COUNT: 14.9 % (ref 11.5–14.5)
ERYTHROCYTE [SEDIMENTATION RATE] IN BLOOD: 27 MM/HR (ref 0–20)
GFR NON-AFRICAN AMERICAN: 34
GLUCOSE UR STRIP-MCNC: NORMAL MG/DL
HGB BLD-MCNC: 13.1 G/DL (ref 11–16)
HYALINE CASTS #/AREA URNS LPF: (no result) /LPF (ref 0–2)
LEUKOCYTE ESTERASE UR-ACNC: (no result) LEU/UL
LYMPHOCYTES # BLD AUTO: 2.1 K/UL (ref 1–4.3)
LYMPHOCYTES NFR BLD AUTO: 51 % (ref 20–40)
MCH RBC QN AUTO: 28.3 PG (ref 27–31)
MCHC RBC AUTO-ENTMCNC: 33.1 G/DL (ref 33–37)
MCV RBC AUTO: 85.4 FL (ref 81–99)
MONOCYTES # BLD: 0.7 K/UL (ref 0–0.8)
MONOCYTES NFR BLD: 16.1 % (ref 0–10)
NEUTROPHILS # BLD: 1.3 K/UL (ref 1.8–7)
NEUTROPHILS NFR BLD AUTO: 30.7 % (ref 50–75)
NRBC BLD AUTO-RTO: 0.1 % (ref 0–2)
PH UR STRIP: 5 [PH] (ref 5–8)
PLATELET # BLD: 174 K/UL (ref 130–400)
PMV BLD AUTO: 10 FL (ref 7.2–11.7)
PROT UR STRIP-MCNC: (no result) MG/DL
RBC # BLD AUTO: 4.62 MIL/UL (ref 3.8–5.2)
RBC # UR STRIP: NEGATIVE /UL
SP GR UR STRIP: 1.02 (ref 1–1.03)
SQUAMOUS EPITHIAL: 1 /HPF (ref 0–5)
UROBILINOGEN UR-MCNC: NORMAL MG/DL (ref 0.2–1)
WBC # BLD AUTO: 4.1 K/UL (ref 4.8–10.8)

## 2019-04-10 RX ADMIN — TIOTROPIUM BROMIDE SCH: 18 CAPSULE ORAL; RESPIRATORY (INHALATION) at 10:34

## 2019-04-10 RX ADMIN — METOPROLOL SUCCINATE SCH MG: 100 TABLET, EXTENDED RELEASE ORAL at 09:23

## 2019-04-10 RX ADMIN — ENOXAPARIN SODIUM SCH MG: 40 INJECTION SUBCUTANEOUS at 09:23

## 2019-04-10 NOTE — CP.PCM.PN
Subjective





- Date & Time of Evaluation


Date of Evaluation: 04/10/19


Time of Evaluation: 21:39





- Subjective


Subjective: 





Patient is currently feeling well.


Still minimal cough noted, but patient is feeling well at this time, no fever 

noted today no chest pain or shortness of breath





On examination:


Vital signs are stable.


Blood pressure is much controlled than before.


Chest bilateral good air entry no wheezing or rales noted





Labs reviewed


Awaiting for renal vascular Doppler at this time





Assessment and recommendation:


73-year-old female with a history of uncontrolled hypertension improved 

markedly.


Secondary hypertension likely.


Osteoarthritis.


Pain in the right knee.


Acute bronchitis.


Patient is clinically stable, but a possible discharge plan tomorrow and will 

follow the patient





Objective





- Vital Signs/Intake and Output


Vital Signs (last 24 hours): 


                                        











Temp Pulse Resp BP Pulse Ox


 


 98.6 F   70   20   120/62   98 


 


 04/10/19 15:43  04/10/19 15:43  04/10/19 15:43  04/10/19 15:43  04/10/19 15:43











- Medications


Medications: 


                               Current Medications





Acetaminophen/Codeine Phosphate (Tylenol/Codeine 300 Mg/30 Mg)  1 ea PO Q6 PRN


   PRN Reason: Pain, moderate (4-7)


   Last Admin: 04/09/19 21:20 Dose:  1 ea


Amlodipine Besylate (Norvasc)  10 mg PO DAILY [facility]


   Last Admin: 04/10/19 09:24 Dose:  10 mg


Aspirin (Ecotrin)  81 mg PO DAILY [facility]


   Last Admin: 04/10/19 09:24 Dose:  81 mg


Chlorthalidone (Hygroton)  25 mg PO DAILY [facility]


Clopidogrel Bisulfate (Plavix)  75 mg PO DAILY [facility]


   Last Admin: 04/10/19 09:23 Dose:  75 mg


Enoxaparin Sodium (Lovenox)  40 mg SC DAILY [facility]


   Last Admin: 04/10/19 09:23 Dose:  40 mg


Ergocalciferol (Drisdol 50,000 Intl Units Cap)  1 cap PO QWK [facility]


Famotidine (Pepcid)  20 mg PO HS [facility]


   Last Admin: 04/09/19 21:20 Dose:  20 mg


Fluticasone/Vilanterol (Breo Ellipta 100-25 Mcg Inh)  1 puff INH RQ24 [facility]


Hydralazine HCl (Apresoline)  25 mg PO Q4 PRN


   PRN Reason: Other


Hydralazine HCl (Apresoline)  100 mg PO BID [facility]


   Last Admin: 04/10/19 18:09 Dose:  100 mg


Loratadine (Claritin)  10 mg PO DAILY [facility]


   Stop: 04/12/19 10:01


   Last Admin: 04/10/19 09:23 Dose:  10 mg


Losartan Potassium (Cozaar)  100 mg PO DAILY [facility]


   Last Admin: 04/10/19 09:23 Dose:  100 mg


Metoprolol Succinate (Toprol Xl)  100 mg PO DAILY [facility]


   Last Admin: 04/10/19 09:23 Dose:  100 mg


Rosuvastatin Calcium (Crestor)  20 mg PO HS [facility]


   Last Admin: 04/09/19 21:20 Dose:  20 mg


Tiotropium Bromide (Spiriva Inhalation Handihaler Device)  1 inhaler INH RQD [facility]


Tiotropium Bromide (Spiriva)  18 mcg INH RQ24 [facility]


   Last Admin: 04/10/19 10:34 Dose:  Not Given











- Labs


Labs: 


                                        





                                 04/10/19 06:04 





                                 04/10/19 06:04

## 2019-04-10 NOTE — CP.PCM.PN
Subjective





- Date & Time of Evaluation


Date of Evaluation: 04/09/19


Time of Evaluation: 16:20





- Subjective


Subjective: 





Patient seen and evaluated


denies chest pain and dyspnea





Stress test normal


ECHO with normal EF





Objective





- Vital Signs/Intake and Output


Vital Signs (last 24 hours): 


                                        











Temp Pulse Resp BP Pulse Ox


 


 100.3 F H  74   20   127/70   96 


 


 04/10/19 05:05  04/10/19 05:05  04/10/19 05:05  04/10/19 05:05  04/10/19 05:05








Intake and Output: 


                                        











 04/09/19 04/10/19





 18:59 06:59


 


Intake Total  480


 


Balance  480














- Medications


Medications: 


                               Current Medications





Acetaminophen/Codeine Phosphate (Tylenol/Codeine 300 Mg/30 Mg)  1 ea PO Q6 PRN


   PRN Reason: Pain, moderate (4-7)


   Last Admin: 04/09/19 21:20 Dose:  1 ea


Amlodipine Besylate (Norvasc)  10 mg PO DAILY [facility]


   Last Admin: 04/09/19 10:56 Dose:  10 mg


Aspirin (Ecotrin)  81 mg PO DAILY [facility]


   Last Admin: 04/09/19 10:55 Dose:  81 mg


Azithromycin (Zithromax)  250 mg PO DAILY [facility]; Protocol


   Last Admin: 04/09/19 10:56 Dose:  250 mg


Chlorthalidone (Hygroton)  25 mg PO DAILY [facility]


   Last Admin: 04/09/19 10:55 Dose:  25 mg


Clopidogrel Bisulfate (Plavix)  75 mg PO DAILY [facility]


   Last Admin: 04/09/19 10:56 Dose:  75 mg


Enoxaparin Sodium (Lovenox)  40 mg SC DAILY [facility]


   Last Admin: 04/09/19 10:58 Dose:  40 mg


Ergocalciferol (Drisdol 50,000 Intl Units Cap)  1 cap PO QWK [facility]


Famotidine (Pepcid)  20 mg PO HS [facility]


   Last Admin: 04/09/19 21:20 Dose:  20 mg


Fluticasone/Vilanterol (Breo Ellipta 100-25 Mcg Inh)  1 puff INH RQ24 [facility]


Hydralazine HCl (Apresoline)  25 mg PO Q4 PRN


   PRN Reason: Other


Hydralazine HCl (Apresoline)  100 mg PO BID [facility]


   Last Admin: 04/09/19 17:27 Dose:  100 mg


Loratadine (Claritin)  10 mg PO DAILY [facility]


   Stop: 04/12/19 10:01


   Last Admin: 04/09/19 10:58 Dose:  10 mg


Losartan Potassium (Cozaar)  100 mg PO DAILY [facility]


   Last Admin: 04/09/19 10:55 Dose:  100 mg


Metoprolol Succinate (Toprol Xl)  100 mg PO DAILY [facility]


   Last Admin: 04/09/19 10:56 Dose:  100 mg


Rosuvastatin Calcium (Crestor)  20 mg PO HS [facility]


   Last Admin: 04/09/19 21:20 Dose:  20 mg


Tiotropium Bromide (Spiriva Inhalation Handihaler Device)  1 inhaler INH RQD [facility]


Tiotropium Bromide (Spiriva)  18 mcg INH RQ24 [facility]


   Last Admin: 04/08/19 09:33 Dose:  Not Given











- Labs


Labs: 


                                        





                                 04/08/19 06:02 





                                 04/08/19 06:02

## 2019-04-10 NOTE — CP.PCM.PN
<Valeria Boucher - Last Filed: 04/10/19 16:53>





Subjective





- Date & Time of Evaluation


Date of Evaluation: 04/10/19


Time of Evaluation: 11:27





- Subjective


Subjective: 


Progress note for Dr. Carrington





Patient was seen and examined at bedside in no acute distress. She has no 

complaints today and reports feeling well. She denies chest pain, palpitations, 

shortness of breath, vision changes, dizziness, headaches, n/v, abdominal pain, 

dysuria, constipation, and diarrhea.





Objective





- Vital Signs/Intake and Output


Vital Signs (last 24 hours): 


                                        











Temp Pulse Resp BP Pulse Ox


 


 99.9 F H  76   20   146/70   97 


 


 04/10/19 07:00  04/10/19 07:00  04/10/19 07:00  04/10/19 07:00  04/10/19 07:00








Intake and Output: 


                                        











 04/10/19 04/10/19





 06:59 18:59


 


Intake Total 480 


 


Balance 480 














- Medications


Medications: 


                               Current Medications





Acetaminophen/Codeine Phosphate (Tylenol/Codeine 300 Mg/30 Mg)  1 ea PO Q6 PRN


   PRN Reason: Pain, moderate (4-7)


   Last Admin: 04/09/19 21:20 Dose:  1 ea


Amlodipine Besylate (Norvasc)  10 mg PO DAILY [facility]


   Last Admin: 04/10/19 09:24 Dose:  10 mg


Aspirin (Ecotrin)  81 mg PO DAILY [facility]


   Last Admin: 04/10/19 09:24 Dose:  81 mg


Azithromycin (Zithromax)  250 mg PO DAILY [facility]; Protocol


   Last Admin: 04/10/19 09:24 Dose:  250 mg


Chlorthalidone (Hygroton)  25 mg PO DAILY [facility]


   Last Admin: 04/10/19 09:23 Dose:  25 mg


Clopidogrel Bisulfate (Plavix)  75 mg PO DAILY [facility]


   Last Admin: 04/10/19 09:23 Dose:  75 mg


Enoxaparin Sodium (Lovenox)  40 mg SC DAILY [facility]


   Last Admin: 04/10/19 09:23 Dose:  40 mg


Ergocalciferol (Drisdol 50,000 Intl Units Cap)  1 cap PO QWK [facility]


Famotidine (Pepcid)  20 mg PO HS [facility]


   Last Admin: 04/09/19 21:20 Dose:  20 mg


Fluticasone/Vilanterol (Breo Ellipta 100-25 Mcg Inh)  1 puff INH RQ24 [facility]


Hydralazine HCl (Apresoline)  25 mg PO Q4 PRN


   PRN Reason: Other


Hydralazine HCl (Apresoline)  100 mg PO BID [facility]


   Last Admin: 04/10/19 09:23 Dose:  100 mg


Loratadine (Claritin)  10 mg PO DAILY [facility]


   Stop: 04/12/19 10:01


   Last Admin: 04/10/19 09:23 Dose:  10 mg


Losartan Potassium (Cozaar)  100 mg PO DAILY [facility]


   Last Admin: 04/10/19 09:23 Dose:  100 mg


Metoprolol Succinate (Toprol Xl)  100 mg PO DAILY [facility]


   Last Admin: 04/10/19 09:23 Dose:  100 mg


Rosuvastatin Calcium (Crestor)  20 mg PO HS [facility]


   Last Admin: 04/09/19 21:20 Dose:  20 mg


Tiotropium Bromide (Spiriva Inhalation Handihaler Device)  1 inhaler INH RQD [facility]


Tiotropium Bromide (Spiriva)  18 mcg INH RQ24 [facility]


   Last Admin: 04/10/19 10:34 Dose:  Not Given











- Labs


Labs: 


                                        





                                 04/10/19 06:04 





                                 04/10/19 06:04 











- Constitutional


Appears: Well, No Acute Distress





- Head Exam


Head Exam: ATRAUMATIC, NORMAL INSPECTION





- Eye Exam


Eye Exam: EOMI, Normal appearance





- ENT Exam


ENT Exam: Mucous Membranes Moist





- Respiratory Exam


Respiratory Exam: Clear to Ausculation Bilateral, NORMAL BREATHING PATTERN.  

absent: Rales, Rhonchi, Wheezes





- Cardiovascular Exam


Cardiovascular Exam: REGULAR RHYTHM, +S1, +S2





- GI/Abdominal Exam


GI & Abdominal Exam: Soft, Normal Bowel Sounds.  absent: Distended, Firm, 

Guarding, Tenderness





- Extremities Exam


Extremities Exam: absent: Pedal Edema





- Neurological Exam


Neurological Exam: Alert, Awake, Oriented x3





- Psychiatric Exam


Psychiatric exam: Normal Affect, Normal Mood





- Skin


Skin Exam: Dry, Normal Color, Warm





Assessment and Plan





- Assessment and Plan (Free Text)


Plan: 


73 year old female with a history of uncontrolled HTN, CAD w/stent, HLD, who 

presented to the hospital for hypertensive urgency.





Uncontrolled Hypertension


Hypertensive Urgency


- Continue Losartan 100mg PO daily, Metoprolol 100mg daily, Norvasc 10mg daily, 

hydralazine 100mg BID, and chlorthalidone 25mg daily


- Continue ASA 81mg PO daily and Crestor 20mg PO HS


- Stress test: no stress-induced ischemia; LV systolic function is normal, EF 

65-70%


- Echo: EF 55%, otherwise normal echo


- EKG: NSR @60bpm; Possible LA enlargement; T wave abnormality


- Nephrology consulted for refractory hypertension; Dr. Lizarraga, help appreciated.





Case discussed with Dr. Zeb Evangelista, PGY2





<Randall Carrington - Last Filed: 04/10/19 23:19>





Objective





- Vital Signs/Intake and Output


Vital Signs (last 24 hours): 


                                        











Temp Pulse Resp BP Pulse Ox


 


 98.6 F   70   20   120/62   98 


 


 04/10/19 15:43  04/10/19 15:43  04/10/19 15:43  04/10/19 15:43  04/10/19 15:43








Intake and Output: 


                                        











 04/10/19 04/11/19





 18:59 06:59


 


Intake Total  400


 


Balance  400














- Medications


Medications: 


                               Current Medications





Acetaminophen/Codeine Phosphate (Tylenol/Codeine 300 Mg/30 Mg)  1 ea PO Q6 PRN


   PRN Reason: Pain, moderate (4-7)


   Last Admin: 04/09/19 21:20 Dose:  1 ea


Amlodipine Besylate (Norvasc)  10 mg PO DAILY [facility]


   Last Admin: 04/10/19 09:24 Dose:  10 mg


Aspirin (Ecotrin)  81 mg PO DAILY [facility]


   Last Admin: 04/10/19 09:24 Dose:  81 mg


Chlorthalidone (Hygroton)  25 mg PO DAILY [facility]


Clopidogrel Bisulfate (Plavix)  75 mg PO DAILY [facility]


   Last Admin: 04/10/19 09:23 Dose:  75 mg


Enoxaparin Sodium (Lovenox)  40 mg SC DAILY [facility]


   Last Admin: 04/10/19 09:23 Dose:  40 mg


Ergocalciferol (Drisdol 50,000 Intl Units Cap)  1 cap PO QWK [facility]


Famotidine (Pepcid)  20 mg PO HS [facility]


   Last Admin: 04/10/19 21:46 Dose:  20 mg


Fluticasone/Vilanterol (Breo Ellipta 100-25 Mcg Inh)  1 puff INH RQ24 [facility]


Hydralazine HCl (Apresoline)  25 mg PO Q4 PRN


   PRN Reason: Other


Hydralazine HCl (Apresoline)  100 mg PO BID [facility]


   Last Admin: 04/10/19 18:09 Dose:  100 mg


Loratadine (Claritin)  10 mg PO DAILY [facility]


   Stop: 04/12/19 10:01


   Last Admin: 04/10/19 09:23 Dose:  10 mg


Losartan Potassium (Cozaar)  100 mg PO DAILY [facility]


   Last Admin: 04/10/19 09:23 Dose:  100 mg


Metoprolol Succinate (Toprol Xl)  100 mg PO DAILY [facility]


   Last Admin: 04/10/19 09:23 Dose:  100 mg


Rosuvastatin Calcium (Crestor)  20 mg PO [facility]


   Last Admin: 04/10/19 21:46 Dose:  20 mg


Tiotropium Bromide (Spiriva Inhalation Handihaler Device)  1 inhaler INH RQD [facility]


Tiotropium Bromide (Spiriva)  18 mcg INH RQ24 [facility]


   Last Admin: 04/10/19 10:34 Dose:  Not Given











- Labs


Labs: 


                                        





                                 04/10/19 06:04 





                                 04/10/19 06:04 











Assessment and Plan





- Assessment and Plan (Free Text)


Plan: 


Patient seen and evaluated personally by me. Plan of care d/w the medical 

resident and as documented

## 2019-04-10 NOTE — CP.PCM.PN
Subjective





- Date & Time of Evaluation


Date of Evaluation: 04/10/19


Time of Evaluation: 17:27





- Subjective


Subjective: 





Nephrology Consultation Note:





Assessment: Stable


uncontrolled severe HTN with urgency


CAD s/p stent and hyperlipidemia 


Vit d deficiency


osteoarthritis s/p Rt TKR


LVH


COPD with bronchitis


ROBBIE





Plan


No acute need for renal replacement therapy at this time.


Hypertension control with meds as ordered. Maintain hemodynamics stable. Avoid 

hypotension. 


Patient on losartan 100 mg/d. also on toprol XL. resume CCB as norvasc 10 mg/d. 

add thiazide diuretic as chlorthalidone 25 mg/d (hold for 2 days as serum cr on 

high side). in addition will give prn hydralazine. hydralazine 100 mg bid


Monitor Input/Output, daily weights and renal function with basic metabolic 

panel


supplement lytes as needed


claritin for few days


abx per primary team





Check UA, urine spot protein/creatinine, albumin/creatinine ratio, urine Na


Secondary HTN work up with plasma renin/aldosterone, renal artery Doppler to r/o

renal artery stenosis neg. pending metanephrine level





Dose meds/antibiotics (if needed) for GFR>60


Glycemic control. 


Further work up/management as per primary team





Thanks for allowing me to participate in care of your patient. Will follow 

patient with you. Please call if any Qs. had d/w team


Dr Ryan Lizarraga


Office: [phone] Cell: [phone] Fax: [phone]





Chief Complaint; high BP


Reason for consult: HTN urgency


HPI: Pt is a 73 F with hx of hypertension (40 years) CAD s/p stent, COPD, 

osteoarthritis s/p Rt TKR and hyperlipidemia presented with complaints of BP in 

220 range hence admitted with HTN urgency. 


Denies OTC/herbal meds or NSAIDs


No recent iodinated contrast exposure. 


pt says BP has been high for last few months. has been better controlled prior 

to that 


denies smoking/etoh/drugs/nsaids/decongestants as sudafed/otc meds/licorice


has allergic nasal congestion. she feels in usual health otherwis





ROS: has nasal congestion and sneezing but better with claritin. febrile 

yesterday. today better


Cardiovascular: No chest pain. 


Pulmonary: No shortness of breath 


Gastrointestinal: denies abdominal pain No nausea. No vomiting. 


Genitourinary: No pain while urinating. Denies blood in urine. 


All other negative except as mentioned in HPI. 





Physical Examination:      


General Appearance: Comfortable, in no acute respiratory distress, co-operative 

.


Vitals reviewed and noted as below


Head; Atraumatic, normocephalic


ENT: no ulcers no thrush. Tongue is midline. Oropharynx: no rash or ulcers.


EYES: Pupils are equal, round and reactive to light accommodation. Eye muscles 

and extraocular movement intact. Sclera is anicteric.


Neck; supple no lymphadenopathy, no thyromegaly or bruit


Lungs: Normal respiratory rate/effort. Breath sounds bilateral equal and clear


Heart: Normal rate. s1s2 normal. No rub or gallop. 


Extremities: no edema. No varicose veins


Neurological: Patient is alert, awake and oriented to person, place and time. No

focal deficit. Strength bilateral appropriate and equal


Skin: Warm and dry. Normal turgor. No rash. Palpitation: Normal elasticity for 

age


Abdomen: Abdomen is soft. Bowel sounds +. There is no abdominal tenderness, no 

guarding/rigidity no organomegaly


Psych: normal insight and normal affect/mood


MSK: no joint tenderness or swelling. Digits and nails normal, no deformity


: kidney or bladder not palpable





Labs/imaging reviewed.


Past medical history, past surgical history, family history, social history, 

allergy reviewed and noted as below


Family hx: no hx of CKD. Rest non-contributory 





echo: mild concentric LVH


renal imaging 2018 WNL including adrenals


edgardo 5





Objective





- Vital Signs/Intake and Output


Vital Signs (last 24 hours): 


                                        











Temp Pulse Resp BP Pulse Ox


 


 98.6 F   70   20   120/62   98 


 


 04/10/19 15:43  04/10/19 15:43  04/10/19 15:43  04/10/19 15:43  04/10/19 15:43








Intake and Output: 


                                        











 04/10/19 04/10/19





 06:59 18:59


 


Intake Total 480 


 


Balance 480 














- Medications


Medications: 


                               Current Medications





Acetaminophen/Codeine Phosphate (Tylenol/Codeine 300 Mg/30 Mg)  1 ea PO Q6 PRN


   PRN Reason: Pain, moderate (4-7)


   Last Admin: 04/09/19 21:20 Dose:  1 ea


Amlodipine Besylate (Norvasc)  10 mg PO DAILY [facility]


   Last Admin: 04/10/19 09:24 Dose:  10 mg


Aspirin (Ecotrin)  81 mg PO DAILY [facility]


   Last Admin: 04/10/19 09:24 Dose:  81 mg


Azithromycin (Zithromax)  250 mg PO DAILY [facility]; Protocol


   Last Admin: 04/10/19 09:24 Dose:  250 mg


Chlorthalidone (Hygroton)  25 mg PO DAILY [facility]


Clopidogrel Bisulfate (Plavix)  75 mg PO DAILY [facility]


   Last Admin: 04/10/19 09:23 Dose:  75 mg


Enoxaparin Sodium (Lovenox)  40 mg SC DAILY [facility]


   Last Admin: 04/10/19 09:23 Dose:  40 mg


Ergocalciferol (Drisdol 50,000 Intl Units Cap)  1 cap PO QWK [facility]


Famotidine (Pepcid)  20 mg PO HS [facility]


   Last Admin: 04/09/19 21:20 Dose:  20 mg


Fluticasone/Vilanterol (Breo Ellipta 100-25 Mcg Inh)  1 puff INH RQ24 [facility]


Hydralazine HCl (Apresoline)  25 mg PO Q4 PRN


   PRN Reason: Other


Hydralazine HCl (Apresoline)  100 mg PO BID [facility]


   Last Admin: 04/10/19 09:23 Dose:  100 mg


Loratadine (Claritin)  10 mg PO DAILY [facility]


   Stop: 04/12/19 10:01


   Last Admin: 04/10/19 09:23 Dose:  10 mg


Losartan Potassium (Cozaar)  100 mg PO DAILY [facility]


   Last Admin: 04/10/19 09:23 Dose:  100 mg


Metoprolol Succinate (Toprol Xl)  100 mg PO DAILY [facility]


   Last Admin: 04/10/19 09:23 Dose:  100 mg


Rosuvastatin Calcium (Crestor)  20 mg PO HS [facility]


   Last Admin: 04/09/19 21:20 Dose:  20 mg


Tiotropium Bromide (Spiriva Inhalation Handihaler Device)  1 inhaler INH RQD [facility]


Tiotropium Bromide (Spiriva)  18 mcg INH RQ24 [facility]


   Last Admin: 04/10/19 10:34 Dose:  Not Given











- Labs


Labs: 


                                        





                                 04/10/19 06:04 





                                 04/10/19 06:04

## 2019-04-11 VITALS
SYSTOLIC BLOOD PRESSURE: 142 MMHG | TEMPERATURE: 98.1 F | OXYGEN SATURATION: 97 % | RESPIRATION RATE: 18 BRPM | DIASTOLIC BLOOD PRESSURE: 76 MMHG | HEART RATE: 63 BPM

## 2019-04-11 RX ADMIN — METOPROLOL SUCCINATE SCH MG: 100 TABLET, EXTENDED RELEASE ORAL at 09:43

## 2019-04-11 RX ADMIN — TIOTROPIUM BROMIDE SCH: 18 CAPSULE ORAL; RESPIRATORY (INHALATION) at 10:29

## 2019-04-11 RX ADMIN — ENOXAPARIN SODIUM SCH: 40 INJECTION SUBCUTANEOUS at 10:00

## 2019-04-11 NOTE — CP.PCM.PN
Subjective





- Date & Time of Evaluation


Date of Evaluation: 04/11/19


Time of Evaluation: 11:02





- Subjective


Subjective: 





Nephrology Consultation Note:





Assessment: Stable


uncontrolled severe HTN with urgency


CAD s/p stent and hyperlipidemia 


Vit d deficiency


osteoarthritis s/p Rt TKR


LVH


COPD with bronchitis


ROBBIE





Plan


No acute need for renal replacement therapy at this time.


Hypertension control with meds as ordered. Maintain hemodynamics stable. Avoid 

hypotension. 


Patient on losartan 100 mg/d. also on toprol XL. resume CCB as norvasc 10 mg/d. 

add thiazide diuretic as chlorthalidone 25 mg/d (hold for 2 days as serum cr on 

high side). in addition will give prn hydralazine. hydralazine 100 mg bid


[if covered by insurance; switch losartan + amlodipine + chlorthalidone to 

Tribenzor 40/10/25 mg 1 tab daily at discharge]





Monitor Input/Output, daily weights and renal function with basic metabolic 

panel


supplement lytes as needed


claritin for few days


abx per primary team





Check UA, urine spot protein/creatinine, albumin/creatinine ratio, urine Na


Secondary HTN work up with plasma renin/aldosterone, renal artery Doppler to r/o

renal artery stenosis neg. pending metanephrine level





Dose meds/antibiotics (if needed) for GFR>60


Glycemic control. 


Further work up/management as per primary team


pt stable for d/c from renal perspective when planned with 1 week outpt f/up. pt

advised to check BP at home and bring log at each visit along with her meds and 

BP machine





Thanks for allowing me to participate in care of your patient. Will follow 

patient with you. Please call if any Qs. had d/w team


Dr Ryan Lizarraga


Office: [phone] Cell: [phone] Fax: [phone]





Chief Complaint; high BP


Reason for consult: HTN urgency


HPI: Pt is a 73 F with hx of hypertension (40 years) CAD s/p stent, COPD, 

osteoarthritis s/p Rt TKR and hyperlipidemia presented with complaints of BP in 

220 range hence admitted with HTN urgency. 


Denies OTC/herbal meds or NSAIDs


No recent iodinated contrast exposure. 


pt says BP has been high for last few months. has been better controlled prior 

to that 


denies smoking/etoh/drugs/nsaids/decongestants as sudafed/otc meds/licorice


has allergic nasal congestion. she feels in usual health otherwis





ROS: has nasal congestion and sneezing but better with claritin.  today better. 

afebrile now


Cardiovascular: No chest pain. 


Pulmonary: No shortness of breath 


Gastrointestinal: denies abdominal pain No nausea. No vomiting. 


Genitourinary: No pain while urinating. Denies blood in urine. 


All other negative except as mentioned in HPI. 





Physical Examination:      


General Appearance: Comfortable, in no acute respiratory distress, co-operative 

.


Vitals reviewed and noted as below


Head; Atraumatic, normocephalic


ENT: no ulcers no thrush. Tongue is midline. Oropharynx: no rash or ulcers.


EYES: Pupils are equal, round and reactive to light accommodation. Eye muscles 

and extraocular movement intact. Sclera is anicteric.


Neck; supple no lymphadenopathy, no thyromegaly or bruit


Lungs: Normal respiratory rate/effort. Breath sounds bilateral equal and clear


Heart: Normal rate. s1s2 normal. No rub or gallop. 


Extremities: no edema. No varicose veins


Neurological: Patient is alert, awake and oriented to person, place and time. No

focal deficit. Strength bilateral appropriate and equal


Skin: Warm and dry. Normal turgor. No rash. Palpitation: Normal elasticity for 

age


Abdomen: Abdomen is soft. Bowel sounds +. There is no abdominal tenderness, no 

guarding/rigidity no organomegaly


Psych: normal insight and normal affect/mood


MSK: no joint tenderness or swelling. Digits and nails normal, no deformity


: kidney or bladder not palpable





Labs/imaging reviewed.


Past medical history, past surgical history, family history, social history, 

allergy reviewed and noted as below


Family hx: no hx of CKD. Rest non-contributory 





echo: mild concentric LVH


renal imaging 2018 WNL including adrenals


edgardo 5





Objective





- Vital Signs/Intake and Output


Vital Signs (last 24 hours): 


                                        











Temp Pulse Resp BP Pulse Ox


 


 98.9 F   76   20   149/85   96 


 


 04/11/19 07:00  04/11/19 07:00  04/11/19 07:00  04/11/19 07:00  04/11/19 07:00








Intake and Output: 


                                        











 04/11/19 04/11/19





 06:59 18:59


 


Intake Total 400 


 


Balance 400 














- Medications


Medications: 


                               Current Medications





Acetaminophen/Codeine Phosphate (Tylenol/Codeine 300 Mg/30 Mg)  1 ea PO Q6 PRN


   PRN Reason: Pain, moderate (4-7)


   Last Admin: 04/09/19 21:20 Dose:  1 ea


Amlodipine Besylate (Norvasc)  10 mg PO DAILY [facility]


   Last Admin: 04/11/19 09:43 Dose:  10 mg


Aspirin (Ecotrin)  81 mg PO DAILY [facility]


   Last Admin: 04/11/19 09:42 Dose:  81 mg


Chlorthalidone (Hygroton)  25 mg PO DAILY [facility]


Clopidogrel Bisulfate (Plavix)  75 mg PO DAILY [facility]


   Last Admin: 04/11/19 09:42 Dose:  75 mg


Enoxaparin Sodium (Lovenox)  40 mg SC DAILY [facility]


   Last Admin: 04/10/19 09:23 Dose:  40 mg


Ergocalciferol (Drisdol 50,000 Intl Units Cap)  1 cap PO QWK [facility]


Famotidine (Pepcid)  20 mg PO HS [facility]


   Last Admin: 04/10/19 21:46 Dose:  20 mg


Fluticasone/Vilanterol (Breo Ellipta 100-25 Mcg Inh)  1 puff INH RQ24 [facility]


Hydralazine HCl (Apresoline)  25 mg PO Q4 PRN


   PRN Reason: Other


Hydralazine HCl (Apresoline)  100 mg PO BID [facility]


   Last Admin: 04/11/19 09:41 Dose:  100 mg


Loratadine (Claritin)  10 mg PO DAILY [facility]


   Stop: 04/12/19 10:01


   Last Admin: 04/11/19 09:43 Dose:  10 mg


Losartan Potassium (Cozaar)  100 mg PO DAILY [facility]


   Last Admin: 04/11/19 09:42 Dose:  100 mg


Metoprolol Succinate (Toprol Xl)  100 mg PO DAILY [facility]


   Last Admin: 04/11/19 09:43 Dose:  100 mg


Rosuvastatin Calcium (Crestor)  20 mg PO HS [facility]


   Last Admin: 04/10/19 21:46 Dose:  20 mg


Tiotropium Bromide (Spiriva Inhalation Handihaler Device)  1 inhaler INH RQD [facility]


Tiotropium Bromide (Spiriva)  18 mcg INH RQ24 [facility]


   Last Admin: 04/11/19 10:29 Dose:  Not Given











- Labs


Labs: 


                                        





                                 04/10/19 06:04 





                                 04/10/19 06:04